# Patient Record
Sex: MALE | Race: WHITE | HISPANIC OR LATINO | Employment: FULL TIME | URBAN - METROPOLITAN AREA
[De-identification: names, ages, dates, MRNs, and addresses within clinical notes are randomized per-mention and may not be internally consistent; named-entity substitution may affect disease eponyms.]

---

## 2018-01-18 NOTE — MISCELLANEOUS
Message  Return to work or school:   Alayna Simental is under my professional care   He was seen in my office on 10/03/2016   He is able to return to work on  10/05/2016            Signatures   Electronically signed by : Marye Runner, CRNP; Oct  3 2016  1:16PM EST                       (Author)    Electronically signed by : Asya Red; Oct  3 2016  3:07PM EST                       (Author)

## 2018-03-11 ENCOUNTER — OFFICE VISIT (OUTPATIENT)
Dept: URGENT CARE | Facility: CLINIC | Age: 48
End: 2018-03-11

## 2018-03-11 VITALS
DIASTOLIC BLOOD PRESSURE: 82 MMHG | BODY MASS INDEX: 28.7 KG/M2 | WEIGHT: 205 LBS | HEART RATE: 64 BPM | TEMPERATURE: 96 F | RESPIRATION RATE: 16 BRPM | HEIGHT: 71 IN | OXYGEN SATURATION: 97 % | SYSTOLIC BLOOD PRESSURE: 126 MMHG

## 2018-03-11 DIAGNOSIS — R21 RASH: Primary | ICD-10-CM

## 2018-03-11 DIAGNOSIS — L25.9 CONTACT DERMATITIS, UNSPECIFIED CONTACT DERMATITIS TYPE, UNSPECIFIED TRIGGER: ICD-10-CM

## 2018-03-11 PROCEDURE — G0382 LEV 3 HOSP TYPE B ED VISIT: HCPCS

## 2018-03-11 RX ORDER — METHYLPREDNISOLONE 4 MG/1
4 TABLET ORAL DAILY
Qty: 21 TABLET | Refills: 0 | Status: SHIPPED | OUTPATIENT
Start: 2018-03-11 | End: 2019-06-17

## 2018-03-11 RX ORDER — DIPHENHYDRAMINE HCL 25 MG
25 TABLET ORAL EVERY 6 HOURS PRN
Qty: 12 TABLET | Refills: 0 | Status: SHIPPED | OUTPATIENT
Start: 2018-03-11 | End: 2019-07-11 | Stop reason: ALTCHOICE

## 2018-03-11 RX ORDER — FAMOTIDINE 20 MG/1
20 TABLET, FILM COATED ORAL 2 TIMES DAILY
Qty: 28 TABLET | Refills: 0 | Status: SHIPPED | OUTPATIENT
Start: 2018-03-11 | End: 2019-06-17

## 2018-03-11 NOTE — PATIENT INSTRUCTIONS
Acute Rash   WHAT YOU NEED TO KNOW:   A rash is irritation, redness, or itchiness in the skin or mucus membranes  Mucus membranes are areas such as the lining of your nose or throat  Acute means the rash starts suddenly, worsens quickly, and lasts a short time  An acute rash may be caused by a disease, such as hepatitis or vasculitis  The rash may be a reaction to something you are allergic to, such as certain foods, or latex  Certain medicines, including antibiotics, NSAIDs, prescription pain medicine, and aspirin can also cause a rash  DISCHARGE INSTRUCTIONS:   Return to the emergency department if:   · You have sudden trouble breathing or chest pain  · You are vomiting, have a headache or muscle aches, and your throat hurts  Contact your healthcare provider if:   · You have a fever  · You get open wounds from scratching your skin, or you have a wound that is red, swollen, or painful  · Your rash lasts longer than 3 months  · You have swelling or pain in your joints  · You have questions or concerns about your condition or care  Medicines:  If your rash does not go away on its own, you may need the following medicines:  · Antihistamines  may be given to help decrease itching  · Steroids  may be given to decrease inflammation  · Antibiotics  help fight or prevent a bacterial infection  · Take your medicine as directed  Contact your healthcare provider if you think your medicine is not helping or if you have side effects  Tell him of her if you are allergic to any medicine  Keep a list of the medicines, vitamins, and herbs you take  Include the amounts, and when and why you take them  Bring the list or the pill bottles to follow-up visits  Carry your medicine list with you in case of an emergency  Prevent a rash or care for your skin when you have a rash:  Dry skin can lead to more problems  Do not scratch your skin if it itches  You may cause a skin infection by scratching   The following may prevent dry skin, and help your skin look better:  · Use thick cream lotions or petroleum jelly to help soothe your rash  These products work well on areas with thick skin, such as your feet  Cool compresses may also be used to soothe your skin  Apply a cool compress or a cool, wet towel, and then cover it with a dry towel  · Use lukewarm water when you bathe  Hot water may damage your skin more  Pat your skin dry  Do not rub your skin with a towel  · Use detergents, soaps, shampoos, and bubble baths made for sensitive skin  Wear clothes that are made of cotton instead of nylon or wool  Cotton is softer, so it will not hurt your skin as much  Follow up with your healthcare provider as directed: You may need to see a dermatologist if healthcare providers do not know what is causing your rash  You may also need to see a dermatologist if your rash does not get better even with treatment  You may need to see a dietitian if you have allergies to foods  Write down your questions so you remember to ask them during your visits  © 2017 [employer] Information is for End User's use only and may not be sold, redistributed or otherwise used for commercial purposes  All illustrations and images included in CareNotes® are the copyrighted property of [employer]  or [employer]ir [employer]unn  The above information is an  only  It is not intended as medical advice for individual conditions or treatments  Talk to your doctor, nurse or pharmacist before following any medical regimen to see if it is safe and effective for you

## 2018-03-11 NOTE — PROGRESS NOTES
Assessment/Plan:    No problem-specific Assessment & Plan notes found for this encounter  Diagnoses and all orders for this visit:    Rash    Contact dermatitis, unspecified contact dermatitis type, unspecified trigger  -     methylprednisolone (MEDROL) 4 mg tablet; Take 1 tablet (4 mg total) by mouth daily Medrol dose pack 60 mg day one 50 mg day 2 40 mg day 3 30 mg day 4 20 mg day 5 10 mg day 6  -     famotidine (PEPCID) 20 mg tablet; Take 1 tablet (20 mg total) by mouth 2 (two) times a day for 14 days  -     diphenhydrAMINE (BENADRYL) 25 mg tablet; Take 1 tablet (25 mg total) by mouth every 6 (six) hours as needed for itching for up to 3 days          Subjective:      Patient ID: Dulce Lorenzo is a 56 y o  male  3 weeks ago had eggroll with sauce he had not had before  Since then red raised rash over bilat thighs, hands, low back + itching denies any new soap detergents lotions perfumes        The following portions of the patient's history were reviewed and updated as appropriate: allergies, current medications, past family history, past medical history, past social history, past surgical history and problem list     Review of Systems   Constitutional: Negative  HENT: Negative  Negative for congestion  Eyes: Negative  Negative for discharge and redness  Respiratory: Negative  Negative for shortness of breath and wheezing  Cardiovascular: Negative  Negative for chest pain  Gastrointestinal: Negative  Negative for abdominal pain  Endocrine: Negative  Genitourinary: Negative  Musculoskeletal: Negative  Negative for gait problem  Skin: Positive for rash  Negative for color change  Neurological: Negative  Negative for weakness  Psychiatric/Behavioral: Negative            Objective:      /82   Pulse 64   Temp (!) 96 °F (35 6 °C)   Resp 16   Ht 5' 11" (1 803 m)   Wt 93 kg (205 lb)   SpO2 97%   BMI 28 59 kg/m²          Physical Exam   Constitutional: He is oriented to person, place, and time  He appears well-developed and well-nourished  HENT:   Head: Normocephalic  Eyes: Conjunctivae and EOM are normal  Pupils are equal, round, and reactive to light  Neck: Normal range of motion  Cardiovascular: Normal rate  Pulmonary/Chest: Effort normal    Abdominal: Soft  Musculoskeletal: Normal range of motion  Neurological: He is alert and oriented to person, place, and time  Skin: Rash noted  Rash is macular and urticarial         Nursing note and vitals reviewed

## 2018-06-27 ENCOUNTER — LAB (OUTPATIENT)
Dept: LAB | Facility: HOSPITAL | Age: 48
End: 2018-06-27
Attending: INTERNAL MEDICINE
Payer: COMMERCIAL

## 2018-06-27 ENCOUNTER — TRANSCRIBE ORDERS (OUTPATIENT)
Dept: LAB | Facility: HOSPITAL | Age: 48
End: 2018-06-27

## 2018-06-27 DIAGNOSIS — I67.89 ACUTE, BUT ILL-DEFINED, CEREBROVASCULAR DISEASE: Primary | ICD-10-CM

## 2018-06-27 DIAGNOSIS — I67.89 ACUTE, BUT ILL-DEFINED, CEREBROVASCULAR DISEASE: ICD-10-CM

## 2018-06-27 LAB
ALBUMIN SERPL BCP-MCNC: 4.2 G/DL (ref 3.5–5)
ALP SERPL-CCNC: 49 U/L (ref 46–116)
ALT SERPL W P-5'-P-CCNC: 47 U/L (ref 12–78)
ANION GAP SERPL CALCULATED.3IONS-SCNC: 8 MMOL/L (ref 4–13)
AST SERPL W P-5'-P-CCNC: 22 U/L (ref 5–45)
BASOPHILS # BLD AUTO: 0.07 THOUSANDS/ΜL (ref 0–0.1)
BASOPHILS NFR BLD AUTO: 1 % (ref 0–1)
BILIRUB SERPL-MCNC: 0.73 MG/DL (ref 0.2–1)
BUN SERPL-MCNC: 12 MG/DL (ref 5–25)
CALCIUM SERPL-MCNC: 9.2 MG/DL (ref 8.3–10.1)
CHLORIDE SERPL-SCNC: 104 MMOL/L (ref 100–108)
CHOLEST SERPL-MCNC: 246 MG/DL (ref 50–200)
CO2 SERPL-SCNC: 26 MMOL/L (ref 21–32)
CREAT SERPL-MCNC: 0.78 MG/DL (ref 0.6–1.3)
CRP SERPL QL: <3 MG/L
EOSINOPHIL # BLD AUTO: 1.25 THOUSAND/ΜL (ref 0–0.61)
EOSINOPHIL NFR BLD AUTO: 14 % (ref 0–6)
ERYTHROCYTE [DISTWIDTH] IN BLOOD BY AUTOMATED COUNT: 12.6 % (ref 11.6–15.1)
ERYTHROCYTE [SEDIMENTATION RATE] IN BLOOD: 11 MM/HOUR (ref 0–10)
GFR SERPL CREATININE-BSD FRML MDRD: 108 ML/MIN/1.73SQ M
GLUCOSE SERPL-MCNC: 95 MG/DL (ref 65–140)
HCT VFR BLD AUTO: 47.4 % (ref 36.5–49.3)
HDLC SERPL-MCNC: 44 MG/DL (ref 40–60)
HGB BLD-MCNC: 15.7 G/DL (ref 12–17)
IMM GRANULOCYTES # BLD AUTO: 0.04 THOUSAND/UL (ref 0–0.2)
IMM GRANULOCYTES NFR BLD AUTO: 1 % (ref 0–2)
LDLC SERPL CALC-MCNC: 156 MG/DL (ref 0–100)
LYMPHOCYTES # BLD AUTO: 1.72 THOUSANDS/ΜL (ref 0.6–4.47)
LYMPHOCYTES NFR BLD AUTO: 20 % (ref 14–44)
MCH RBC QN AUTO: 31 PG (ref 26.8–34.3)
MCHC RBC AUTO-ENTMCNC: 33.1 G/DL (ref 31.4–37.4)
MCV RBC AUTO: 94 FL (ref 82–98)
MONOCYTES # BLD AUTO: 0.63 THOUSAND/ΜL (ref 0.17–1.22)
MONOCYTES NFR BLD AUTO: 7 % (ref 4–12)
NEUTROPHILS # BLD AUTO: 5.1 THOUSANDS/ΜL (ref 1.85–7.62)
NEUTS SEG NFR BLD AUTO: 57 % (ref 43–75)
NONHDLC SERPL-MCNC: 202 MG/DL
NRBC BLD AUTO-RTO: 0 /100 WBCS
PLATELET # BLD AUTO: 249 THOUSANDS/UL (ref 149–390)
PMV BLD AUTO: 10.5 FL (ref 8.9–12.7)
POTASSIUM SERPL-SCNC: 3.9 MMOL/L (ref 3.5–5.3)
PROT SERPL-MCNC: 7.7 G/DL (ref 6.4–8.2)
RBC # BLD AUTO: 5.06 MILLION/UL (ref 3.88–5.62)
SODIUM SERPL-SCNC: 138 MMOL/L (ref 136–145)
TRIGL SERPL-MCNC: 228 MG/DL
WBC # BLD AUTO: 8.81 THOUSAND/UL (ref 4.31–10.16)

## 2018-06-27 PROCEDURE — 36415 COLL VENOUS BLD VENIPUNCTURE: CPT

## 2018-06-27 PROCEDURE — 86140 C-REACTIVE PROTEIN: CPT

## 2018-06-27 PROCEDURE — 85025 COMPLETE CBC W/AUTO DIFF WBC: CPT

## 2018-06-27 PROCEDURE — 80061 LIPID PANEL: CPT

## 2018-06-27 PROCEDURE — 85652 RBC SED RATE AUTOMATED: CPT

## 2018-06-27 PROCEDURE — 80053 COMPREHEN METABOLIC PANEL: CPT

## 2018-11-02 ENCOUNTER — DOCUMENTATION (OUTPATIENT)
Dept: NEUROLOGY | Facility: CLINIC | Age: 48
End: 2018-11-02

## 2018-11-02 ENCOUNTER — TELEPHONE (OUTPATIENT)
Dept: NEUROLOGY | Facility: CLINIC | Age: 48
End: 2018-11-02

## 2018-11-02 ENCOUNTER — OFFICE VISIT (OUTPATIENT)
Dept: NEUROLOGY | Facility: CLINIC | Age: 48
End: 2018-11-02
Payer: COMMERCIAL

## 2018-11-02 VITALS
WEIGHT: 205 LBS | BODY MASS INDEX: 28.7 KG/M2 | DIASTOLIC BLOOD PRESSURE: 80 MMHG | HEIGHT: 71 IN | SYSTOLIC BLOOD PRESSURE: 122 MMHG | HEART RATE: 64 BPM | RESPIRATION RATE: 14 BRPM

## 2018-11-02 DIAGNOSIS — R29.898 RIGHT LEG WEAKNESS: ICD-10-CM

## 2018-11-02 DIAGNOSIS — R41.0 DISORIENTATION: ICD-10-CM

## 2018-11-02 DIAGNOSIS — I63.9 CEREBROVASCULAR ACCIDENT (CVA), UNSPECIFIED MECHANISM (HCC): ICD-10-CM

## 2018-11-02 DIAGNOSIS — R29.2 HYPERREFLEXIA OF LOWER EXTREMITY: ICD-10-CM

## 2018-11-02 DIAGNOSIS — R26.89 IMBALANCE: ICD-10-CM

## 2018-11-02 DIAGNOSIS — R00.2 HEART PALPITATIONS: ICD-10-CM

## 2018-11-02 DIAGNOSIS — R53.1 WEAKNESS: Primary | ICD-10-CM

## 2018-11-02 DIAGNOSIS — Z86.39 H/O HYPERGLYCEMIA: ICD-10-CM

## 2018-11-02 DIAGNOSIS — R47.1 DYSARTHRIA: ICD-10-CM

## 2018-11-02 DIAGNOSIS — S06.9X5S TRAUMATIC BRAIN INJURY, WITH LOSS OF CONSCIOUSNESS GREATER THAN 24 HOURS WITH RETURN TO PRE-EXISTING CONSCIOUS LEVEL, SEQUELA (HCC): ICD-10-CM

## 2018-11-02 PROCEDURE — 99245 OFF/OP CONSLTJ NEW/EST HI 55: CPT | Performed by: PSYCHIATRY & NEUROLOGY

## 2018-11-02 RX ORDER — ATORVASTATIN CALCIUM 40 MG/1
40 TABLET, FILM COATED ORAL DAILY
COMMUNITY
End: 2020-01-29

## 2018-11-02 RX ORDER — ADHESIVE BANDAGE 3/4"
BANDAGE TOPICAL
Qty: 1 EACH | Refills: 0 | Status: SHIPPED | OUTPATIENT
Start: 2018-11-02 | End: 2019-06-17
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
## 2018-11-02 NOTE — TELEPHONE ENCOUNTER
----- Message from [address], DO sent at 11/2/2018 11:22 AM EDT -----  Regarding: Start ASA 81  Please call patient (significant other speaks [place]- her name is Aly Navas- number should be in chart) and notify her that he is okay to start aspirin  We received a copy of his CT records  CT scan showed a small stroke on the right side of his brain, and his left side weakness, facial droop, sensory loss noted on exam today can correlated to stroke in that location       Thanks

## 2019-05-06 ENCOUNTER — TRANSCRIBE ORDERS (OUTPATIENT)
Dept: LAB | Facility: CLINIC | Age: 49
End: 2019-05-06

## 2019-05-06 ENCOUNTER — APPOINTMENT (OUTPATIENT)
Dept: LAB | Facility: CLINIC | Age: 49
End: 2019-05-06
Payer: COMMERCIAL

## 2019-05-06 DIAGNOSIS — R26.89 IMBALANCE: ICD-10-CM

## 2019-05-06 DIAGNOSIS — R00.2 HEART PALPITATIONS: ICD-10-CM

## 2019-05-06 DIAGNOSIS — Z86.39 H/O HYPERGLYCEMIA: ICD-10-CM

## 2019-05-06 DIAGNOSIS — E78.5 HYPERLIPIDEMIA, UNSPECIFIED HYPERLIPIDEMIA TYPE: Primary | ICD-10-CM

## 2019-05-06 DIAGNOSIS — E78.5 HYPERLIPIDEMIA, UNSPECIFIED HYPERLIPIDEMIA TYPE: ICD-10-CM

## 2019-05-06 DIAGNOSIS — R53.1 WEAKNESS: ICD-10-CM

## 2019-05-06 LAB
CHOLEST SERPL-MCNC: 190 MG/DL (ref 50–200)
EST. AVERAGE GLUCOSE BLD GHB EST-MCNC: 123 MG/DL
FOLATE SERPL-MCNC: >20 NG/ML (ref 3.1–17.5)
HBA1C MFR BLD: 5.9 % (ref 4.2–6.3)
HDLC SERPL-MCNC: 51 MG/DL (ref 40–60)
LDLC SERPL CALC-MCNC: 91 MG/DL (ref 0–100)
NONHDLC SERPL-MCNC: 139 MG/DL
TRIGL SERPL-MCNC: 240 MG/DL
TSH SERPL DL<=0.05 MIU/L-ACNC: 2.11 UIU/ML (ref 0.36–3.74)
VIT B12 SERPL-MCNC: 385 PG/ML (ref 100–900)

## 2019-05-06 PROCEDURE — 84443 ASSAY THYROID STIM HORMONE: CPT

## 2019-05-06 PROCEDURE — 80061 LIPID PANEL: CPT

## 2019-05-06 PROCEDURE — 82607 VITAMIN B-12: CPT

## 2019-05-06 PROCEDURE — 36415 COLL VENOUS BLD VENIPUNCTURE: CPT

## 2019-05-06 PROCEDURE — 83036 HEMOGLOBIN GLYCOSYLATED A1C: CPT

## 2019-05-06 PROCEDURE — 82746 ASSAY OF FOLIC ACID SERUM: CPT

## 2019-05-07 ENCOUNTER — HOSPITAL ENCOUNTER (OUTPATIENT)
Dept: NEUROLOGY | Facility: AMBULATORY SURGERY CENTER | Age: 49
Discharge: HOME | End: 2019-05-07
Payer: COMMERCIAL

## 2019-05-07 ENCOUNTER — HOSPITAL ENCOUNTER (OUTPATIENT)
Dept: RADIOLOGY | Facility: HOSPITAL | Age: 49
Discharge: HOME | End: 2019-05-07
Attending: PSYCHIATRY & NEUROLOGY
Payer: COMMERCIAL

## 2019-05-07 DIAGNOSIS — S06.9X5S TRAUMATIC BRAIN INJURY, WITH LOSS OF CONSCIOUSNESS GREATER THAN 24 HOURS WITH RETURN TO PRE-EXISTING CONSCIOUS LEVEL, SEQUELA (HCC): ICD-10-CM

## 2019-05-07 DIAGNOSIS — I63.9 CEREBROVASCULAR ACCIDENT (CVA), UNSPECIFIED MECHANISM (HCC): ICD-10-CM

## 2019-05-07 DIAGNOSIS — R29.898 RIGHT LEG WEAKNESS: ICD-10-CM

## 2019-05-07 DIAGNOSIS — R29.2 HYPERREFLEXIA OF LOWER EXTREMITY: ICD-10-CM

## 2019-05-07 DIAGNOSIS — R41.0 DISORIENTATION: ICD-10-CM

## 2019-05-07 PROCEDURE — 95816 EEG AWAKE AND DROWSY: CPT | Performed by: PSYCHIATRY & NEUROLOGY

## 2019-05-07 PROCEDURE — 72131 CT LUMBAR SPINE W/O DYE: CPT

## 2019-05-07 PROCEDURE — 95816 EEG AWAKE AND DROWSY: CPT

## 2019-05-15 ENCOUNTER — EVALUATION (OUTPATIENT)
Dept: PHYSICAL THERAPY | Facility: CLINIC | Age: 49
End: 2019-05-15
Payer: COMMERCIAL

## 2019-05-15 DIAGNOSIS — M54.16 LUMBAR RADICULOPATHY: ICD-10-CM

## 2019-05-15 DIAGNOSIS — Z86.73 HISTORY OF CVA (CEREBROVASCULAR ACCIDENT): ICD-10-CM

## 2019-05-15 DIAGNOSIS — R26.89 IMBALANCE: Primary | ICD-10-CM

## 2019-05-15 PROCEDURE — 97163 PT EVAL HIGH COMPLEX 45 MIN: CPT | Performed by: PHYSICAL THERAPIST

## 2019-05-16 ENCOUNTER — TELEPHONE (OUTPATIENT)
Dept: NEUROLOGY | Facility: CLINIC | Age: 49
End: 2019-05-16

## 2019-05-16 DIAGNOSIS — M54.16 RIGHT LUMBAR RADICULITIS: ICD-10-CM

## 2019-05-16 DIAGNOSIS — R29.898 RIGHT LEG WEAKNESS: Primary | ICD-10-CM

## 2019-05-17 DIAGNOSIS — R29.898 RIGHT LEG WEAKNESS: Primary | ICD-10-CM

## 2019-05-17 DIAGNOSIS — M54.16 RIGHT LUMBAR RADICULITIS: ICD-10-CM

## 2019-05-20 ENCOUNTER — APPOINTMENT (OUTPATIENT)
Dept: PHYSICAL THERAPY | Facility: CLINIC | Age: 49
End: 2019-05-20
Payer: COMMERCIAL

## 2019-05-21 ENCOUNTER — HOSPITAL ENCOUNTER (OUTPATIENT)
Dept: NEUROLOGY | Facility: HOSPITAL | Age: 49
Discharge: HOME | End: 2019-05-21
Attending: PSYCHIATRY & NEUROLOGY
Payer: COMMERCIAL

## 2019-05-21 DIAGNOSIS — R29.898 RIGHT LEG WEAKNESS: ICD-10-CM

## 2019-05-21 DIAGNOSIS — M54.16 RIGHT LUMBAR RADICULITIS: ICD-10-CM

## 2019-05-21 PROCEDURE — 95886 MUSC TEST DONE W/N TEST COMP: CPT | Performed by: PSYCHIATRY & NEUROLOGY

## 2019-05-21 PROCEDURE — 95909 NRV CNDJ TST 5-6 STUDIES: CPT | Performed by: PSYCHIATRY & NEUROLOGY

## 2019-05-22 ENCOUNTER — APPOINTMENT (OUTPATIENT)
Dept: PHYSICAL THERAPY | Facility: CLINIC | Age: 49
End: 2019-05-22
Payer: COMMERCIAL

## 2019-05-28 ENCOUNTER — HOSPITAL ENCOUNTER (OUTPATIENT)
Dept: NON INVASIVE DIAGNOSTICS | Facility: CLINIC | Age: 49
Discharge: HOME | End: 2019-05-28
Payer: COMMERCIAL

## 2019-05-28 DIAGNOSIS — R00.2 HEART PALPITATIONS: ICD-10-CM

## 2019-05-28 DIAGNOSIS — R53.1 WEAKNESS: ICD-10-CM

## 2019-05-28 DIAGNOSIS — I63.9 CEREBROVASCULAR ACCIDENT (CVA), UNSPECIFIED MECHANISM (HCC): ICD-10-CM

## 2019-05-28 DIAGNOSIS — R47.1 DYSARTHRIA: ICD-10-CM

## 2019-05-28 PROCEDURE — 93880 EXTRACRANIAL BILAT STUDY: CPT

## 2019-05-28 PROCEDURE — 93306 TTE W/DOPPLER COMPLETE: CPT

## 2019-05-28 PROCEDURE — 93306 TTE W/DOPPLER COMPLETE: CPT | Performed by: INTERNAL MEDICINE

## 2019-05-28 PROCEDURE — 93880 EXTRACRANIAL BILAT STUDY: CPT | Performed by: SURGERY

## 2019-05-29 ENCOUNTER — APPOINTMENT (OUTPATIENT)
Dept: PHYSICAL THERAPY | Facility: CLINIC | Age: 49
End: 2019-05-29
Payer: COMMERCIAL

## 2019-05-30 ENCOUNTER — TELEPHONE (OUTPATIENT)
Dept: NEUROLOGY | Facility: CLINIC | Age: 49
End: 2019-05-30

## 2019-06-17 ENCOUNTER — TRANSCRIBE ORDERS (OUTPATIENT)
Dept: PAIN MEDICINE | Facility: CLINIC | Age: 49
End: 2019-06-17

## 2019-06-17 ENCOUNTER — CONSULT (OUTPATIENT)
Dept: PAIN MEDICINE | Facility: CLINIC | Age: 49
End: 2019-06-17
Payer: COMMERCIAL

## 2019-06-17 VITALS — DIASTOLIC BLOOD PRESSURE: 70 MMHG | SYSTOLIC BLOOD PRESSURE: 126 MMHG | HEART RATE: 70 BPM

## 2019-06-17 DIAGNOSIS — M51.17 INTERVERTEBRAL DISC DISORDER WITH RADICULOPATHY OF LUMBOSACRAL REGION: Primary | ICD-10-CM

## 2019-06-17 DIAGNOSIS — R29.898 LEFT LEG WEAKNESS: ICD-10-CM

## 2019-06-17 PROCEDURE — 99244 OFF/OP CNSLTJ NEW/EST MOD 40: CPT | Performed by: ANESTHESIOLOGY

## 2019-07-11 ENCOUNTER — HOSPITAL ENCOUNTER (OUTPATIENT)
Dept: RADIOLOGY | Facility: CLINIC | Age: 49
Discharge: HOME | End: 2019-07-11
Attending: ANESTHESIOLOGY
Payer: COMMERCIAL

## 2019-07-11 VITALS
TEMPERATURE: 97.9 F | DIASTOLIC BLOOD PRESSURE: 72 MMHG | OXYGEN SATURATION: 99 % | HEART RATE: 50 BPM | RESPIRATION RATE: 50 BRPM | SYSTOLIC BLOOD PRESSURE: 106 MMHG

## 2019-07-11 DIAGNOSIS — M51.17 INTERVERTEBRAL DISC DISORDER WITH RADICULOPATHY OF LUMBOSACRAL REGION: ICD-10-CM

## 2019-07-11 PROCEDURE — 64483 NJX AA&/STRD TFRM EPI L/S 1: CPT | Performed by: ANESTHESIOLOGY

## 2019-07-11 RX ORDER — BUPIVACAINE HCL/PF 2.5 MG/ML
10 VIAL (ML) INJECTION ONCE
Status: COMPLETED | OUTPATIENT
Start: 2019-07-11 | End: 2019-07-11

## 2019-07-11 RX ORDER — 0.9 % SODIUM CHLORIDE 0.9 %
10 VIAL (ML) INJECTION ONCE
Status: COMPLETED | OUTPATIENT
Start: 2019-07-11 | End: 2019-07-11

## 2019-07-11 RX ORDER — METHYLPREDNISOLONE ACETATE 80 MG/ML
80 INJECTION, SUSPENSION INTRA-ARTICULAR; INTRALESIONAL; INTRAMUSCULAR; PARENTERAL; SOFT TISSUE ONCE
Status: COMPLETED | OUTPATIENT
Start: 2019-07-11 | End: 2019-07-11

## 2019-07-11 RX ADMIN — SODIUM CHLORIDE 5 ML: 9 INJECTION, SOLUTION INTRAMUSCULAR; INTRAVENOUS; SUBCUTANEOUS at 09:36

## 2019-07-11 RX ADMIN — METHYLPREDNISOLONE ACETATE 80 MG: 80 INJECTION, SUSPENSION INTRA-ARTICULAR; INTRALESIONAL; INTRAMUSCULAR; PARENTERAL; SOFT TISSUE at 09:37

## 2019-07-11 RX ADMIN — BUPIVACAINE HYDROCHLORIDE 2 ML: 2.5 INJECTION, SOLUTION EPIDURAL; INFILTRATION; INTRACAUDAL at 09:37

## 2019-07-11 RX ADMIN — IOHEXOL 1 ML: 300 INJECTION, SOLUTION INTRAVENOUS at 09:37

## 2019-07-11 RX ADMIN — Medication 5 ML: at 09:36

## 2019-07-11 NOTE — DISCHARGE INSTR - LAB
Epidural Steroid Injection   WHAT YOU NEED TO KNOW:   An epidural steroid injection (NICOLLE) is a procedure to inject steroid medicine into the epidural space  The epidural space is between your spinal cord and vertebrae  Steroids reduce inflammation and fluid buildup in your spine that may be causing pain  You may be given pain medicine along with the steroids  ACTIVITY  · Do not drive or operate machinery today  · No strenuous activity today  bending, lifting, etc   · You may resume normal activites starting tomorrow  start slowly and as tolerated  · You may shower today, but no tub baths or hot tubs  · You may have numbness for several hours from the local anesthetic  Please use caution and common sense, especially with weight-bearing activities  CARE OF THE INJECTION SITE  · If you have soreness or pain, apply ice to the area today (20 minutes on/20 minutes off)  · Starting tomorrow, you may use warm, moist heat or ice if needed  · You may have an increase or change in your discomfort for 36-48 hours after your treatment  · Apply ice and continue with any pain medication you have been prescribed  · Notify [facility] if you have any of the following: redness, drainage, swelling, headache, stiff neck or fever above 100°F     SPECIAL INSTRUCTIONS  · Our office will contact you in approximately 7 days for a progress report  MEDICATIONS  · Continue to take all routine medications  · Our office may have instructed you to hold some medications  If you have a problem specifically related to your procedure, please call our office at [phone]  Problems not related to your procedure should be directed to your primary care physician

## 2019-07-11 NOTE — H&P
History of Present Illness: The patient is a 53 y o  male who presents with complaints of left lower back and leg pain secondary to lumbar disc disease and is here today for bilateral L5 transforaminal epidural steroid injection  Patient Active Problem List   Diagnosis    Rash    Contact dermatitis    [facility]A) [facility]    [facility]    Polyneuropathy, peripheral sensorimotor axonal       Past Medical History:   Diagnosis Date    Hyperlipidemia     Migraine        Past Surgical History:   Procedure Laterality Date    FACIAL FRACTURE SURGERY           Current Outpatient Medications:     atorvastatin (LIPITOR) 40 mg tablet, Take 40 mg by mouth daily, Disp: , Rfl:     Multiple Vitamin (MULTIVITAMINS PO), Take by mouth, Disp: , Rfl:     Current Facility-Administered Medications:     bupivacaine (PF) (MARCAINE) 0 25 % injection 10 mL, 10 mL, Epidural, Once, Clem Romero MD    iohexol (OMNIPAQUE) 300 mg/mL injection 50 mL, 50 mL, Epidural, Once, Clem Romero MD    lidocaine (PF) (XYLOCAINE-MPF) 2 % injection 5 mL, 5 mL, Infiltration, Once, Clem Romero MD    methylPREDNISolone acetate (DEPO-MEDROL) injection 80 mg, 80 mg, Epidural, Once, Clem Romero MD    sodium chloride (PF) 0 9 % injection 10 mL, 10 mL, Infiltration, Once, Clem Romero MD    No Known Allergies    Physical Exam:   Vitals:    07/11/19 0920   BP: 118/73   Pulse: (!) 50   Resp: 20   Temp: 97 9 °F (36 6 °C)   SpO2: 98%     General: Awake, Alert, Oriented x 3, Mood and affect appropriate  Respiratory: Respirations even and unlabored  Cardiovascular: Peripheral pulses intact; no edema  Musculoskeletal Exam:   Left lower back tenderness    ASA Score: 3    Patient/Chart Verification  Patient ID Verified: Verbal  Consents Confirmed: To be obtained in the Pre-Procedure area  H&P( within 30 days) Verified: To be obtained in the Pre-Procedure area  Allergies Reviewed:  Yes  Anticoag/NSAID held?: NA  Currently on antibiotics?: No    Assessment:   1   Intervertebral disc disorder with radiculopathy of lumbosacral region        Plan:   Bilateral L5 transforaminal epidural steroid injection

## 2019-07-17 ENCOUNTER — OFFICE VISIT (OUTPATIENT)
Dept: NEUROLOGY | Facility: CLINIC | Age: 49
End: 2019-07-17
Payer: COMMERCIAL

## 2019-07-17 ENCOUNTER — TELEPHONE (OUTPATIENT)
Dept: NEUROLOGY | Facility: CLINIC | Age: 49
End: 2019-07-17

## 2019-07-17 VITALS
HEART RATE: 75 BPM | HEIGHT: 71 IN | BODY MASS INDEX: 26.88 KG/M2 | SYSTOLIC BLOOD PRESSURE: 120 MMHG | WEIGHT: 192 LBS | RESPIRATION RATE: 16 BRPM | DIASTOLIC BLOOD PRESSURE: 80 MMHG

## 2019-07-17 DIAGNOSIS — S06.9X5S TRAUMATIC BRAIN INJURY, WITH LOSS OF CONSCIOUSNESS GREATER THAN 24 HOURS WITH RETURN TO PRE-EXISTING CONSCIOUS LEVEL, SEQUELA (HCC): ICD-10-CM

## 2019-07-17 DIAGNOSIS — G60.8 POLYNEUROPATHY, PERIPHERAL SENSORIMOTOR AXONAL: ICD-10-CM

## 2019-07-17 DIAGNOSIS — M51.17 INTERVERTEBRAL DISC DISORDER WITH RADICULOPATHY OF LUMBOSACRAL REGION: ICD-10-CM

## 2019-07-17 DIAGNOSIS — I63.30 CEREBROVASCULAR ACCIDENT (CVA) DUE TO THROMBOSIS OF CEREBRAL ARTERY (HCC): Primary | ICD-10-CM

## 2019-07-17 PROCEDURE — 99215 OFFICE O/P EST HI 40 MIN: CPT | Performed by: PSYCHIATRY & NEUROLOGY

## 2019-07-17 RX ORDER — GABAPENTIN 300 MG/1
300 CAPSULE ORAL
Qty: 30 CAPSULE | Refills: 3 | Status: SHIPPED | OUTPATIENT
Start: 2019-07-17 | End: 2019-08-29 | Stop reason: CLARIF
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
## 2019-07-17 NOTE — TELEPHONE ENCOUNTER
Called patient to see if patient still coming to apt today in [place] with Dr Jim Conway   [address] [place] [address] Drive

## 2019-07-17 NOTE — PATIENT INSTRUCTIONS
Reduce sugar intake  Consider trial of over the counter alphalipoic acid 300-600 mg daily for neuropathy

## 2019-07-18 ENCOUNTER — TELEPHONE (OUTPATIENT)
Dept: PAIN MEDICINE | Facility: CLINIC | Age: 49
End: 2019-07-18

## 2019-08-08 ENCOUNTER — TELEPHONE (OUTPATIENT)
Dept: PAIN MEDICINE | Facility: MEDICAL CENTER | Age: 49
End: 2019-08-08

## 2019-08-08 DIAGNOSIS — M51.16 INTERVERTEBRAL DISC DISORDER WITH RADICULOPATHY OF LUMBAR REGION: ICD-10-CM

## 2019-08-08 DIAGNOSIS — M79.18 MYOFASCIAL PAIN SYNDROME: Primary | ICD-10-CM

## 2019-08-08 DIAGNOSIS — M54.16 LUMBAR RADICULOPATHY: ICD-10-CM

## 2019-08-08 RX ORDER — TIZANIDINE 4 MG/1
4 TABLET ORAL
Qty: 30 TABLET | Refills: 0 | Status: SHIPPED | OUTPATIENT
Start: 2019-08-08 | End: 2020-01-29 | Stop reason: SDUPTHER

## 2019-08-08 NOTE — TELEPHONE ENCOUNTER
S/W pt's wife, he is s/p B/L L5 TFESI from 7/11/19, she said 2 days ago he began with bad pain again  Current pain is only in the center of the lower back, no leg pain  He has neuropathy of his feet  She said the pain is interrupting his sleep and she wakes up and finds him in the living room  She has been suing ibuprofen for the pain  I saw gabapentin on his med list from the neurologist written 7/17/19 and asked if he was taking that and she said when they saw the neurologist on 7/17 she thought the doctor wasn't going to prescribe it  Wife wasn't aware it was sent to the pharmacy  She is going to call their office to clarify if he is suppose to pick it up and start taking it  I told her that FQ is out of office for 2 wks but will have our CRNP NM advise some rec  In the meantime he should continue with the ibuprofen and try using ice or heat as needed  Wife aware we will c/b later today or tomorrow with NM's rec  Wife Doris Huffman cell [phone] or work # [phone]

## 2019-08-08 NOTE — TELEPHONE ENCOUNTER
Yes we can repeat injection  In the meantime I can prescribe a muscle relaxant to see if that helps and rule out if pain muscular  I will send  Script for Tizanidine 4 mg at night if interested   Let me know     Order for bilateral L5 TFESI in [facility]

## 2019-08-08 NOTE — TELEPHONE ENCOUNTER
Pt wife is calling stating pt is in increasing pain after his injection that was on 7/11    Pt wife states he cannot sleep, pain level 8/10 she is asking if the patient can have another injection and in the meantime if there is something Dr Melissa Lopez can prescribe to help with the pain?       Pt wife can be reached at [phone] or [phone]

## 2019-08-08 NOTE — TELEPHONE ENCOUNTER
Told wife repeat inj has been ordered and a  will call them over the next several days to schedule it  Wife would like to proceed with tizanidine, told common s/e are drowsiness and dizziness  Pls send Rx to [facility] in [place] on file  No need to c/b once sent as she can't pick it up until tomorrow

## 2019-08-29 ENCOUNTER — HOSPITAL ENCOUNTER (INPATIENT)
Facility: HOSPITAL | Age: 49
LOS: 1 days | Discharge: HOME | DRG: 149 | End: 2019-08-31
Attending: EMERGENCY MEDICINE | Admitting: INTERNAL MEDICINE
Payer: COMMERCIAL

## 2019-08-29 ENCOUNTER — APPOINTMENT (EMERGENCY)
Dept: RADIOLOGY | Facility: HOSPITAL | Age: 49
DRG: 149 | End: 2019-08-29
Payer: COMMERCIAL

## 2019-08-29 DIAGNOSIS — R09.89 SUSPECTED CEREBROVASCULAR ACCIDENT (CVA): ICD-10-CM

## 2019-08-29 DIAGNOSIS — R42 DIZZINESS: ICD-10-CM

## 2019-08-29 DIAGNOSIS — R42 VERTIGO: Primary | ICD-10-CM

## 2019-08-29 DIAGNOSIS — H53.2 DOUBLE VISION: ICD-10-CM

## 2019-08-29 LAB
ALBUMIN SERPL BCP-MCNC: 4.6 G/DL (ref 3.5–5)
ALP SERPL-CCNC: 61 U/L (ref 46–116)
ALT SERPL W P-5'-P-CCNC: 43 U/L (ref 12–78)
ANION GAP SERPL CALCULATED.3IONS-SCNC: 9 MMOL/L (ref 4–13)
AST SERPL W P-5'-P-CCNC: 20 U/L (ref 5–45)
ATRIAL RATE: 63 BPM
BASOPHILS # BLD AUTO: 0.04 THOUSANDS/ΜL (ref 0–0.1)
BASOPHILS NFR BLD AUTO: 0 % (ref 0–1)
BILIRUB SERPL-MCNC: 0.39 MG/DL (ref 0.2–1)
BUN SERPL-MCNC: 10 MG/DL (ref 5–25)
CALCIUM SERPL-MCNC: 9.4 MG/DL (ref 8.3–10.1)
CHLORIDE SERPL-SCNC: 110 MMOL/L (ref 100–108)
CO2 SERPL-SCNC: 26 MMOL/L (ref 21–32)
CREAT SERPL-MCNC: 0.89 MG/DL (ref 0.6–1.3)
EOSINOPHIL # BLD AUTO: 0.08 THOUSAND/ΜL (ref 0–0.61)
EOSINOPHIL NFR BLD AUTO: 1 % (ref 0–6)
ERYTHROCYTE [DISTWIDTH] IN BLOOD BY AUTOMATED COUNT: 12.3 % (ref 11.6–15.1)
GFR SERPL CREATININE-BSD FRML MDRD: 102 ML/MIN/1.73SQ M
GLUCOSE SERPL-MCNC: 104 MG/DL (ref 65–140)
HCT VFR BLD AUTO: 43.9 % (ref 36.5–49.3)
HGB BLD-MCNC: 14.7 G/DL (ref 12–17)
IMM GRANULOCYTES # BLD AUTO: 0.05 THOUSAND/UL (ref 0–0.2)
IMM GRANULOCYTES NFR BLD AUTO: 1 % (ref 0–2)
LYMPHOCYTES # BLD AUTO: 2.12 THOUSANDS/ΜL (ref 0.6–4.47)
LYMPHOCYTES NFR BLD AUTO: 21 % (ref 14–44)
MCH RBC QN AUTO: 30.7 PG (ref 26.8–34.3)
MCHC RBC AUTO-ENTMCNC: 33.5 G/DL (ref 31.4–37.4)
MCV RBC AUTO: 92 FL (ref 82–98)
MONOCYTES # BLD AUTO: 0.94 THOUSAND/ΜL (ref 0.17–1.22)
MONOCYTES NFR BLD AUTO: 9 % (ref 4–12)
NEUTROPHILS # BLD AUTO: 7.02 THOUSANDS/ΜL (ref 1.85–7.62)
NEUTS SEG NFR BLD AUTO: 68 % (ref 43–75)
NRBC BLD AUTO-RTO: 0 /100 WBCS
P AXIS: 54 DEGREES
PLATELET # BLD AUTO: 249 THOUSANDS/UL (ref 149–390)
PMV BLD AUTO: 10.4 FL (ref 8.9–12.7)
POTASSIUM SERPL-SCNC: 3.9 MMOL/L (ref 3.5–5.3)
PR INTERVAL: 148 MS
PROT SERPL-MCNC: 7.8 G/DL (ref 6.4–8.2)
QRS AXIS: -6 DEGREES
QRSD INTERVAL: 94 MS
QT INTERVAL: 394 MS
QTC INTERVAL: 403 MS
RBC # BLD AUTO: 4.79 MILLION/UL (ref 3.88–5.62)
SODIUM SERPL-SCNC: 145 MMOL/L (ref 136–145)
T WAVE AXIS: 7 DEGREES
VENTRICULAR RATE: 63 BPM
WBC # BLD AUTO: 10.25 THOUSAND/UL (ref 4.31–10.16)

## 2019-08-29 PROCEDURE — 70496 CT ANGIOGRAPHY HEAD: CPT

## 2019-08-29 PROCEDURE — 80053 COMPREHEN METABOLIC PANEL: CPT | Performed by: EMERGENCY MEDICINE

## 2019-08-29 PROCEDURE — 70498 CT ANGIOGRAPHY NECK: CPT

## 2019-08-29 PROCEDURE — 85025 COMPLETE CBC W/AUTO DIFF WBC: CPT | Performed by: EMERGENCY MEDICINE

## 2019-08-29 PROCEDURE — 99285 EMERGENCY DEPT VISIT HI MDM: CPT | Performed by: EMERGENCY MEDICINE

## 2019-08-29 PROCEDURE — 93005 ELECTROCARDIOGRAM TRACING: CPT

## 2019-08-29 PROCEDURE — 99285 EMERGENCY DEPT VISIT HI MDM: CPT

## 2019-08-29 PROCEDURE — 93010 ELECTROCARDIOGRAM REPORT: CPT | Performed by: INTERNAL MEDICINE

## 2019-08-29 PROCEDURE — 36415 COLL VENOUS BLD VENIPUNCTURE: CPT | Performed by: EMERGENCY MEDICINE

## 2019-08-29 RX ORDER — TIZANIDINE 4 MG/1
4 TABLET ORAL DAILY PRN
Status: DISCONTINUED | OUTPATIENT
Start: 2019-08-29 | End: 2019-08-31 | Stop reason: HOSPADM

## 2019-08-29 RX ORDER — MECLIZINE HYDROCHLORIDE 25 MG/1
25 TABLET ORAL EVERY 8 HOURS PRN
Status: DISCONTINUED | OUTPATIENT
Start: 2019-08-29 | End: 2019-08-31 | Stop reason: HOSPADM

## 2019-08-29 RX ORDER — ATORVASTATIN CALCIUM 40 MG/1
40 TABLET, FILM COATED ORAL
Status: DISCONTINUED | OUTPATIENT
Start: 2019-08-30 | End: 2019-08-30

## 2019-08-29 RX ORDER — ASPIRIN 81 MG/1
81 TABLET, CHEWABLE ORAL DAILY
Status: DISCONTINUED | OUTPATIENT
Start: 2019-08-30 | End: 2019-08-31 | Stop reason: HOSPADM

## 2019-08-29 RX ADMIN — IOHEXOL 85 ML: 350 INJECTION, SOLUTION INTRAVENOUS at 20:51

## 2019-08-29 NOTE — LETTER
[address] [facility] [address]  [address] [facility] [address]  Dept: [phone]    August 31, 2019     Patient: Jeremiah Gill   YOB: 1970   Date of Visit: 8/29/2019       To Whom it May Concern:    Jeremiah Gill is under my professional care  He was seen in the hospital from 8/29/2019   to 08/31/19  He may return to work on 09/02/2019    If you have any questions or concerns, please don't hesitate to call           Sincerely,          Tod Garcia MD

## 2019-08-29 NOTE — ED PROVIDER NOTES
History  Chief Complaint   Patient presents with    Dizziness     dizziness for 3 days,no chest pain, no shortness of breath,no headache     56-year-old male with a history of TBI in the distant past as well as CVA with left-sided deficits now resolved presenting to the emergency department with dizziness for the last 3 days that has worsened today  Patient describes as the room spinning  It is worse when he moves his head or when he walks  No specific positions worsen it  He notes that he has decreased visual fields and the right side for the last several months  He has double vision when he looks to the left  He denies any weakness or numbness in the upper lower extremities  No difficulty with speech  No change in his speech pattern or difficulty understanding words  No recent fevers or chills, sinus congestion, URI symptoms  No nausea or vomiting  Patient states that he thinks the symptoms are secondary to his statin medication because he has had milder versions of this in the past since starting the statin 8 months ago  Prior to Admission Medications   Prescriptions Last Dose Informant Patient Reported? Taking? Multiple Vitamin (MULTIVITAMINS PO) 8/28/2019 at Unknown time  Yes Yes   Sig: Take by mouth   atorvastatin (LIPITOR) 40 mg tablet 8/28/2019 at Unknown time  Yes Yes   Sig: Take 40 mg by mouth daily   tiZANidine (ZANAFLEX) 4 mg tablet Past Week at Unknown time  No Yes   Sig: Take 1 tablet (4 mg total) by mouth daily at bedtime      Facility-Administered Medications: None       Past Medical History:   Diagnosis Date    Hyperlipidemia     Migraine     Stroke ([facility] [address] )     possibleTIA       Past Surgical History:   Procedure Laterality Date    FACIAL FRACTURE SURGERY         Family History   Problem Relation Age of Onset    Diabetes Mother      I have reviewed and agree with the history as documented      Social History     Tobacco Use    Smoking status: Former Smoker    Smokeless tobacco: Never Used   Substance Use Topics    Alcohol use: Not Currently    Drug use: No        Review of Systems   Constitutional: Negative for chills and fever  HENT: Negative for congestion, ear pain, sinus pressure, sinus pain and sore throat  Eyes: Positive for visual disturbance  Respiratory: Negative for cough and shortness of breath  Cardiovascular: Negative for chest pain and palpitations  Gastrointestinal: Negative for abdominal pain, diarrhea, nausea and vomiting  Genitourinary: Negative for difficulty urinating and dysuria  Musculoskeletal: Negative for myalgias  Skin: Negative for rash  Neurological: Positive for dizziness  Negative for weakness, light-headedness, numbness and headaches  Physical Exam  ED Triage Vitals [08/29/19 1932]   Temperature Pulse Respirations Blood Pressure SpO2   97 7 °F (36 5 °C) 58 18 143/86 97 %      Temp Source Heart Rate Source Patient Position - Orthostatic VS BP Location FiO2 (%)   Oral Monitor Lying Left arm --      Pain Score       No Pain             Orthostatic Vital Signs  Vitals:    08/29/19 1932 08/29/19 2041 08/29/19 2135 08/29/19 2341   BP: 143/86 134/76 132/76 129/85   Pulse: 58 58 (!) 54 57   Patient Position - Orthostatic VS: Lying Sitting Lying        Physical Exam   Constitutional: He is oriented to person, place, and time  He appears well-developed and well-nourished  No distress  HENT:   Head: Normocephalic and atraumatic  Mouth/Throat: Oropharynx is clear and moist    Eyes: Pupils are equal, round, and reactive to light  EOM are normal    Neck: Normal range of motion  Neck supple  Cardiovascular: Normal rate, regular rhythm, normal heart sounds and intact distal pulses  Pulmonary/Chest: Effort normal and breath sounds normal    Abdominal: Soft  Bowel sounds are normal  There is no tenderness  Musculoskeletal: Normal range of motion  He exhibits no edema     Neurological: He is alert and oriented to person, place, and time  He displays normal reflexes  A cranial nerve deficit is present  No sensory deficit  He exhibits normal muscle tone  Coordination normal    Subtle right-sided facial droop with left tongue deviation  Uvula is midline  Eyebrows raise equally  Patient reports normal sensation in the face  Normal hearing  His speech is fluent  The no difficulty rotating his head  No pronator drift, normal strength in bilateral upper extremities, normal strength bilateral lower extremities, normal heel to shin  Skin: Skin is warm and dry  Capillary refill takes less than 2 seconds  Nursing note and vitals reviewed        ED Medications  Medications   atorvastatin (LIPITOR) tablet 40 mg (has no administration in time range)   multivitamin-minerals (CENTRUM) tablet 1 tablet (has no administration in time range)   tiZANidine (ZANAFLEX) tablet 4 mg (has no administration in time range)   aspirin chewable tablet 81 mg (has no administration in time range)   enoxaparin (LOVENOX) subcutaneous injection 40 mg (has no administration in time range)   meclizine (ANTIVERT) tablet 25 mg (has no administration in time range)   iohexol (OMNIPAQUE) 350 MG/ML injection (MULTI-DOSE) 85 mL (85 mL Intravenous Given 8/29/19 2051)       Diagnostic Studies  Results Reviewed     Procedure Component Value Units Date/Time    Comprehensive metabolic panel [[id number]]  (Abnormal) Collected:  08/29/19 1950    Lab Status:  Final result Specimen:  Blood from Arm, Right Updated:  08/29/19 2042     Sodium 145 mmol/L      Potassium 3 9 mmol/L      Chloride 110 mmol/L      CO2 26 mmol/L      ANION GAP 9 mmol/L      BUN 10 mg/dL      Creatinine 0 89 mg/dL      Glucose 104 mg/dL      Calcium 9 4 mg/dL      AST 20 U/L      ALT 43 U/L      Alkaline Phosphatase 61 U/L      Total Protein 7 8 g/dL      Albumin 4 6 g/dL      Total Bilirubin 0 39 mg/dL      eGFR 102 ml/min/1 73sq m     Narrative:       [place] guidelines for Chronic Kidney Disease (CKD):     Stage 1 with normal or high GFR (GFR > 90 mL/min/1 73 square meters)    Stage 2 Mild CKD (GFR = 60-89 mL/min/1 73 square meters)    Stage 3A Moderate CKD (GFR = 45-59 mL/min/1 73 square meters)    Stage 3B Moderate CKD (GFR = 30-44 mL/min/1 73 square meters)    Stage 4 Severe CKD (GFR = 15-29 mL/min/1 73 square meters)    Stage 5 End Stage CKD (GFR <15 mL/min/1 73 square meters)  Note: GFR calculation is accurate only with a steady state creatinine    CBC and differential [[id number]]  (Abnormal) Collected:  08/29/19 1950    Lab Status:  Final result Specimen:  Blood from Arm, Right Updated:  08/29/19 1959     WBC 10 25 Thousand/uL      RBC 4 79 Million/uL      Hemoglobin 14 7 g/dL      Hematocrit 43 9 %      MCV 92 fL      MCH 30 7 pg      MCHC 33 5 g/dL      RDW 12 3 %      MPV 10 4 fL      Platelets 151 Thousands/uL      nRBC 0 /100 WBCs      Neutrophils Relative 68 %      Immat GRANS % 1 %      Lymphocytes Relative 21 %      Monocytes Relative 9 %      Eosinophils Relative 1 %      Basophils Relative 0 %      Neutrophils Absolute 7 02 Thousands/µL      Immature Grans Absolute 0 05 Thousand/uL      Lymphocytes Absolute 2 12 Thousands/µL      Monocytes Absolute 0 94 Thousand/µL      Eosinophils Absolute 0 08 Thousand/µL      Basophils Absolute 0 04 Thousands/µL                  CTA head and neck with and without contrast   Final Result by Rachel Barney MD (08/29 2126)         1  No evidence of acute infarct, intracranial hemorrhage or mass effect  2   No stenosis, dissection or occlusion of the carotid or vertebral arteries or major vessels of the [place]                    Workstation performed: [id number]         MRI Inpatient Order    (Results Pending)         Procedures  Procedures        ED Course             Stroke Assessment     Row Name 08/29/19 2003             NIH Stroke Scale    Interval  Baseline      Level of Consciousness (1a )  0      LOC Questions (1b )  0      LOC Commands (1c )  0      Best Gaze (2 )  0      Visual (3 )  1 Right sided visual field deficit      Facial Palsy (4 )  0      Motor Arm, Left (5a )  0      Motor Arm, Right (5b )  0      Motor Leg, Left (6a )  0      Motor Leg, Right (6b )  0      Limb Ataxia (7 )  0      Sensory (8 )  0      Best Language (9 )  0      Dysarthria (10 )  0      Extinction and Inattention (11 ) (Formerly Neglect)  0      Total  1          First Filed Value   TPA Decision  Patient not a TPA candidate  Patient is not a candidate options  Unclear time of onset outside appropriate time window , Symptoms resolved/clearly non disabling  MDM  Number of Diagnoses or Management Options  Suspected cerebrovascular accident (CVA):   Vertigo:   Diagnosis management comments: 25-year-old male presenting emergency department with vertigo and tongue deviation  He has a stagnant on exam concerning for central cause  Initial CTA was negative  His symptoms have been present for the last 3 days and therefore he does not qualify for tPA and a stroke alert was not called  Patient has a previous history of CVA as well though has never had an MRI to confirm this  Given the concerning symptoms patient was admitted for neurologic evaluation        Disposition  Final diagnoses:   Vertigo   Suspected cerebrovascular accident (CVA)     Time reflects when diagnosis was documented in both MDM as applicable and the Disposition within this note     Time User Action Codes Description Comment    8/29/2019 10:10 PM Jose Cheng Add [R42] Vertigo     8/29/2019 10:11 PM Neil Espiridion Rhein Add [R09 89] Unknown when suspected stroke patient was last well     8/29/2019 10:11 PM Neil Espiridion Rhein Remove [R09 89] Unknown when suspected stroke patient was last well     8/29/2019 10:11 PM Neil, Espiridion Rhein Add [R09 89] Suspected cerebrovascular accident (CVA)     8/29/2019 11:02 PM Nirmal Johnson Add [H53 2] Double vision     8/29/2019 11:03 PM Guille Query Add [R42] Dizziness       ED Disposition     ED Disposition Condition Date/Time Comment    Admit Stable Thu Aug 29, 2019 10:12 PM Case was discussed with VALERIE and the patient's admission status was agreed to be Admission Status: obs status to the service of Dr Juanita Benitez   Follow-up Information    None         Current Discharge Medication List      CONTINUE these medications which have NOT CHANGED    Details   atorvastatin (LIPITOR) 40 mg tablet Take 40 mg by mouth daily      Multiple Vitamin (MULTIVITAMINS PO) Take by mouth      tiZANidine (ZANAFLEX) 4 mg tablet Take 1 tablet (4 mg total) by mouth daily at bedtime  Qty: 30 tablet, Refills: 0    Associated Diagnoses: Myofascial pain syndrome           No discharge procedures on file  ED Provider  Attending physically available and evaluated Daniels Seek  I managed the patient along with the ED Attending      Electronically Signed by         Gopi Noe MD  08/30/19 5322

## 2019-08-30 ENCOUNTER — APPOINTMENT (OUTPATIENT)
Dept: NON INVASIVE DIAGNOSTICS | Facility: HOSPITAL | Age: 49
DRG: 149 | End: 2019-08-30
Payer: COMMERCIAL

## 2019-08-30 LAB
ALBUMIN SERPL BCP-MCNC: 4.2 G/DL (ref 3.5–5)
ALP SERPL-CCNC: 56 U/L (ref 46–116)
ALT SERPL W P-5'-P-CCNC: 38 U/L (ref 12–78)
ANION GAP SERPL CALCULATED.3IONS-SCNC: 8 MMOL/L (ref 4–13)
AST SERPL W P-5'-P-CCNC: 18 U/L (ref 5–45)
BILIRUB SERPL-MCNC: 0.53 MG/DL (ref 0.2–1)
BUN SERPL-MCNC: 12 MG/DL (ref 5–25)
CALCIUM SERPL-MCNC: 9.6 MG/DL (ref 8.3–10.1)
CHLORIDE SERPL-SCNC: 110 MMOL/L (ref 100–108)
CHOLEST SERPL-MCNC: 203 MG/DL (ref 50–200)
CO2 SERPL-SCNC: 26 MMOL/L (ref 21–32)
CREAT SERPL-MCNC: 0.89 MG/DL (ref 0.6–1.3)
CRP SERPL QL: <3 MG/L
ERYTHROCYTE [DISTWIDTH] IN BLOOD BY AUTOMATED COUNT: 12.3 % (ref 11.6–15.1)
ERYTHROCYTE [SEDIMENTATION RATE] IN BLOOD: 14 MM/HOUR (ref 0–10)
EST. AVERAGE GLUCOSE BLD GHB EST-MCNC: 123 MG/DL
GFR SERPL CREATININE-BSD FRML MDRD: 102 ML/MIN/1.73SQ M
GLUCOSE SERPL-MCNC: 100 MG/DL (ref 65–140)
HBA1C MFR BLD: 5.9 % (ref 4.2–6.3)
HCT VFR BLD AUTO: 43.8 % (ref 36.5–49.3)
HDLC SERPL-MCNC: 42 MG/DL (ref 40–60)
HGB BLD-MCNC: 14.6 G/DL (ref 12–17)
LDLC SERPL CALC-MCNC: 114 MG/DL (ref 0–100)
MCH RBC QN AUTO: 31 PG (ref 26.8–34.3)
MCHC RBC AUTO-ENTMCNC: 33.3 G/DL (ref 31.4–37.4)
MCV RBC AUTO: 93 FL (ref 82–98)
PLATELET # BLD AUTO: 242 THOUSANDS/UL (ref 149–390)
PMV BLD AUTO: 10.3 FL (ref 8.9–12.7)
POTASSIUM SERPL-SCNC: 3.8 MMOL/L (ref 3.5–5.3)
PROT SERPL-MCNC: 7.3 G/DL (ref 6.4–8.2)
RBC # BLD AUTO: 4.71 MILLION/UL (ref 3.88–5.62)
SODIUM SERPL-SCNC: 144 MMOL/L (ref 136–145)
TRIGL SERPL-MCNC: 233 MG/DL
WBC # BLD AUTO: 7.54 THOUSAND/UL (ref 4.31–10.16)

## 2019-08-30 PROCEDURE — G8987 SELF CARE CURRENT STATUS: HCPCS

## 2019-08-30 PROCEDURE — 97163 PT EVAL HIGH COMPLEX 45 MIN: CPT

## 2019-08-30 PROCEDURE — 93325 DOPPLER ECHO COLOR FLOW MAPG: CPT | Performed by: INTERNAL MEDICINE

## 2019-08-30 PROCEDURE — 80061 LIPID PANEL: CPT | Performed by: STUDENT IN AN ORGANIZED HEALTH CARE EDUCATION/TRAINING PROGRAM

## 2019-08-30 PROCEDURE — 93308 TTE F-UP OR LMTD: CPT

## 2019-08-30 PROCEDURE — G8978 MOBILITY CURRENT STATUS: HCPCS

## 2019-08-30 PROCEDURE — 93321 DOPPLER ECHO F-UP/LMTD STD: CPT | Performed by: INTERNAL MEDICINE

## 2019-08-30 PROCEDURE — 99252 IP/OBS CONSLTJ NEW/EST SF 35: CPT | Performed by: PSYCHIATRY & NEUROLOGY

## 2019-08-30 PROCEDURE — G8979 MOBILITY GOAL STATUS: HCPCS

## 2019-08-30 PROCEDURE — 93308 TTE F-UP OR LMTD: CPT | Performed by: INTERNAL MEDICINE

## 2019-08-30 PROCEDURE — 80307 DRUG TEST PRSMV CHEM ANLYZR: CPT | Performed by: PHYSICIAN ASSISTANT

## 2019-08-30 PROCEDURE — 83036 HEMOGLOBIN GLYCOSYLATED A1C: CPT | Performed by: STUDENT IN AN ORGANIZED HEALTH CARE EDUCATION/TRAINING PROGRAM

## 2019-08-30 PROCEDURE — 86140 C-REACTIVE PROTEIN: CPT | Performed by: INTERNAL MEDICINE

## 2019-08-30 PROCEDURE — 85027 COMPLETE CBC AUTOMATED: CPT | Performed by: STUDENT IN AN ORGANIZED HEALTH CARE EDUCATION/TRAINING PROGRAM

## 2019-08-30 PROCEDURE — 85652 RBC SED RATE AUTOMATED: CPT | Performed by: INTERNAL MEDICINE

## 2019-08-30 PROCEDURE — 80053 COMPREHEN METABOLIC PANEL: CPT | Performed by: STUDENT IN AN ORGANIZED HEALTH CARE EDUCATION/TRAINING PROGRAM

## 2019-08-30 PROCEDURE — G8988 SELF CARE GOAL STATUS: HCPCS

## 2019-08-30 PROCEDURE — 97166 OT EVAL MOD COMPLEX 45 MIN: CPT

## 2019-08-30 PROCEDURE — G8989 SELF CARE D/C STATUS: HCPCS

## 2019-08-30 RX ORDER — ATORVASTATIN CALCIUM 80 MG/1
80 TABLET, FILM COATED ORAL
Status: DISCONTINUED | OUTPATIENT
Start: 2019-08-30 | End: 2019-08-31 | Stop reason: HOSPADM

## 2019-08-30 RX ADMIN — ASPIRIN 81 MG 81 MG: 81 TABLET ORAL at 09:38

## 2019-08-30 RX ADMIN — Medication 1 TABLET: at 09:38

## 2019-08-30 RX ADMIN — ENOXAPARIN SODIUM 40 MG: 40 INJECTION SUBCUTANEOUS at 09:38

## 2019-08-30 RX ADMIN — ATORVASTATIN CALCIUM 80 MG: 80 TABLET, FILM COATED ORAL at 18:50
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
## 2019-08-30 NOTE — OCCUPATIONAL THERAPY NOTE
[address] Evaluation     Patient Name: Kendell Valdez  NCFSB'D Date: 8/30/2019  Problem List  Patient Active Problem List   Diagnosis    Rash    Contact dermatitis    [facility]    TBI [facility]traumatic [facility]) ([facility]    Polyneuropathy, peripheral sensorimotor axonal    Intervertebral disc disorder with radiculopathy of lumbosacral region    Dizziness    Double vision     Past Medical History  Past Medical History:   Diagnosis Date    Hyperlipidemia     Migraine     Stroke ([facility] )     Nika Brim     Past Surgical History  Past Surgical History:   Procedure Laterality Date    FACIAL FRACTURE SURGERY          08/30/19 1355   Note Type   Note type Eval only   Restrictions/Precautions   Weight Bearing Precautions Per Order No   Other Precautions Cognitive; Chair Alarm; Bed Alarm;Multiple lines; Fall Risk   Pain Assessment   Pain Assessment No/denies pain   Pain Score No Pain   Home Living   Additional Comments Pt resides in a house with his wife PTA  Pt's wife is able to help as necessary, but works  Prior Function   Level of [place] Independent with ADLs and functional mobility   Lives With Spouse   Receives Help From Family   ADL Assistance Independent   IADLs Independent   Falls in the last 6 months 0  (many near falls)   Vocational Full time employment   Lifestyle   Autonomy Pt reports being I with ADLs, IADLs, and functional mobility without use of DME PTA  Pt is a      Reciprocal Relationships supportive wife   Service to Others works FT in a [address] active PTA   [address] ([address] X   Patient Behaviors/Mood [place] affect   ADL   Eating Assistance 7  Independent   Grooming Assistance 5  Supervision/Setup   UB Bathing Assistance 5  Supervision/Setup   LB Bathing Assistance 5  Supervision/Setup   UB Dressing Assistance 5  Supervision/Setup   LB Dressing Assistance 4  Minimal [address]  5 Supervision/Setup   Bed Mobility   Supine to Sit 6  Modified independent   Sit to Supine 5  Supervision   Transfers   Sit to Stand 5  Supervision   Stand to Sit 5  Supervision   Functional Mobility   Functional Mobility 5  Supervision   Additional Comments reports increased dizziness with head position change   Balance   Static Sitting Fair +   Dynamic Sitting Fair   Static Standing Fair   Dynamic Standing Fair   Ambulatory Fair   Activity Tolerance   Activity Tolerance Patient tolerated treatment well;Patient limited by fatigue   Medical Staff Made Aware CM   Nurse Made Aware Pt cleared by RN for OT evaluation and OOB mobility   RUE Assessment   RUE Assessment WFL   LUE Assessment   LUE Assessment WFL   Hand Function   Gross Motor Coordination Functional  (mild ataxic gait)   Fine Motor Coordination Functional   Sensation   Light Touch No apparent deficits   Cognition   Overall Cognitive Status Impaired   Arousal/Participation Alert; Cooperative;Responsive   Attention Attends with cues to redirect   Orientation Level Oriented X4   Memory Unable to assess   Following Commands Follows one step commands with increased time or repetition   Comments Pt with baseline cognitive deficits 2* TBI and CVA   Assessment   Limitation Decreased self-care trans;Decreased high-level ADLs; Decreased Safe judgement during ADL;Decreased cognition   Prognosis Fair   Assessment Pt is a 53year old male seen for initial OT evaluation s/p admission to [facility] with dizziness and diplopia  PMHx includes: hyperlipidemia, TBI, and CVA  Pt with active OT orders and up with assistance orders  Pt cleared by RN for OT evaluation and OOB mobility  SHELLEY Gilbert present to assist with translation  Pt resides with his wife in a house PTA  Pt was I with ADLs, IADLs, and functional mobility without use of DME PTA  Pt is a   Pt is currently functioning at/close to his functional baseline and does not require continued OT services    From an OT standpoint, recommend outpatient OT, fitness to drive, and a shower chair upon d/c   OT orders to be d/c at this time  Please re-consult OT if medically/clinically appropriate     Goals   Patient Goals to go home   Recommendation   OT Discharge Recommendation Outpatient OT  (functional cog, FTD)   Equipment Recommended Tub seat with back   Barthel Index   Feeding 10   Bathing 0   Grooming Score 5   Dressing Score 5   Bladder Score 10   Bowels Score 10   Toilet Use Score 10   Transfers (Bed/Chair) Score 10   Mobility (Level Surface) Score 10   Stairs Score 0   Barthel Index Score 70     Beth Vargas, MOT, OTR/L

## 2019-08-30 NOTE — PLAN OF CARE
Problem: Potential for Falls  Goal: Patient will remain free of falls  Description  INTERVENTIONS:  - Assess patient frequently for physical needs  -  Identify cognitive and physical deficits and behaviors that affect risk of falls    -  [place] fall precautions as indicated by assessment   - Educate patient/family on patient safety including physical limitations  - Instruct patient to call for assistance with activity based on assessment  - Modify environment to reduce risk of injury  - Consider OT/PT consult to assist with strengthening/mobility  Outcome: Progressing     Problem: PAIN - ADULT  Goal: Verbalizes/displays adequate comfort level or baseline comfort level  Description  Interventions:  - Encourage patient to monitor pain and request assistance  - Assess pain using appropriate pain scale  - Administer analgesics based on type and severity of pain and evaluate response  - Implement non-pharmacological measures as appropriate and evaluate response  - Consider cultural and social influences on pain and pain management  - Notify physician/advanced practitioner if interventions unsuccessful or patient reports new pain  Outcome: Progressing     Problem: SAFETY ADULT  Goal: Maintain or return to baseline ADL function  Description  INTERVENTIONS:  -  Assess patient's ability to carry out ADLs; assess patient's baseline for ADL function and identify physical deficits which impact ability to perform ADLs (bathing, care of mouth/teeth, toileting, grooming, dressing, etc )  - Assess/evaluate cause of self-care deficits   - Assess range of motion  - Assess patient's mobility; develop plan if impaired  - Assess patient's need for assistive devices and provide as appropriate  - Encourage maximum independence but intervene and supervise when necessary  - Involve family in performance of ADLs  - Assess for home care needs following discharge   - Consider OT consult to assist with ADL evaluation and planning for discharge  - Provide patient education as appropriate  Outcome: Progressing  Goal: Maintain or return mobility status to optimal level  Description  INTERVENTIONS:  - Assess patient's baseline mobility status (ambulation, transfers, stairs, etc )    - Identify cognitive and physical deficits and behaviors that affect mobility  - Identify mobility aids required to assist with transfers and/or ambulation (gait belt, sit-to-stand, lift, walker, cane, etc )  - [place] fall precautions as indicated by assessment  - Record patient progress and toleration of activity level on Mobility SBAR; progress patient to next Phase/Stage  - Instruct patient to call for assistance with activity based on assessment  - Consider rehabilitation consult to assist with strengthening/weightbearing, etc   Outcome: Progressing     Problem: DISCHARGE PLANNING  Goal: Discharge to home or other facility with appropriate resources  Description  INTERVENTIONS:  - Identify barriers to discharge w/patient and caregiver  - Arrange for needed discharge resources and transportation as appropriate  - Identify discharge learning needs (meds, wound care, etc )  - Arrange for interpretive services to assist at discharge as needed  - Refer to Case Management Department for coordinating discharge planning if the patient needs post-hospital services based on physician/advanced practitioner order or complex needs related to functional status, cognitive ability, or social support system  Outcome: Progressing     Problem: Knowledge Deficit  Goal: Patient/family/caregiver demonstrates understanding of disease process, treatment plan, medications, and discharge instructions  Description  Complete learning assessment and assess knowledge base    Interventions:  - Provide teaching at level of understanding  - Provide teaching via preferred learning methods  Outcome: Progressing     Problem: NEUROSENSORY - ADULT  Goal: Achieves stable or improved neurological status  Description  INTERVENTIONS  - Monitor and report changes in neurological status  - Monitor vital signs such as temperature, blood pressure, glucose, and any other labs ordered   - Initiate measures to prevent increased intracranial pressure       Outcome: Progressing  Goal: Achieves maximal functionality and self care  Description  INTERVENTIONS  - Monitor swallowing and airway patency with patient fatigue and changes in neurological status  - Encourage and assist patient to increase activity and self care  - Encourage visually impaired, hearing impaired and aphasic patients to use assistive/communication devices  Outcome: Progressing     Problem: Neurological Deficit  Goal: Neurological status is stable or improving  Description  Interventions:  - Monitor and assess patient's level of consciousness, motor function, sensory function, and level of assistance needed for ADLs  - Monitor and report changes from baseline  Collaborate with interdisciplinary team to initiate plan and implement interventions as ordered  - Provide and maintain a safe environment  - Consider seizure precautions  - Consider fall precautions  - Consider aspiration precautions  - Consider bleeding precautions    Outcome: Progressing

## 2019-08-30 NOTE — RESTORATIVE TECHNICIAN NOTE
Restorative Specialist Mobility Note       Activity: Stand at bedside, Ambulate in pena, Ambulate in room, [place] privileges, Dangle(Educated/encouraged pt to ambulate with assistance 3-4 x's/day  Bed alarm on   Pt callbell, phone/tray within reach )     Assistive Device: None       Flex SCHAEFER, Restorative Technician, [employer]
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
## 2019-08-30 NOTE — SOCIAL WORK
Met with pt and explained Cm role  Pt requested CM to contact his wife Ozzy King to complete CM role  Cm contacted pt's wife Ozzy King, contact # [phone] to complete Cm assessment  Pt lives with his wife in a 2 story house with 1 BROOK and BR and BD on 2nd floor with bathroom available on 1st floor  Pt was independent PTA and was able to drive  No DME avail  Pt has a hx of [facility] VNA and a hx of rehab at [facility]  No hx of mental health issues or drug or alcohol use  Pt has a prescription plan and uses [facility] in [address] [place] [address] for his prescriptions  Primary contact is pt's wife Rob , contact # [phone]  Pt's wife will provide pt with transportation home upon discharge  Pt does not have a POA  Informed her of therapy's recommendation for outpt therapy  She was agreeable  Outpt therapy script and locations provided to pt  Informed her of OT recommendation for shower chair and she was agreeable  informed her of locations to purchase a shower chair as it is not covered by insurance  CM reviewed d/c planning process including the following: identifying help at home, patient preference for d/c planning needs, Discharge Lounge, [facility] Meds to Bed program, availability of treatment team to discuss questions or concerns patient and/or family may have regarding understanding medications and recognizing signs and symptoms once discharged  CM also encouraged patient to follow up with all recommended appointments after discharge  Patient advised of importance for patient and family to participate in managing patients medical well being  Patient/caregiver received discharge checklist  Content reviewed  Patient/caregiver encouraged to participate in discharge plan of care prior to discharge home

## 2019-08-30 NOTE — PLAN OF CARE
Problem: Potential for Falls  Goal: Patient will remain free of falls  Description  INTERVENTIONS:  - Assess patient frequently for physical needs  -  Identify cognitive and physical deficits and behaviors that affect risk of falls    -  [place] fall precautions as indicated by assessment   - Educate patient/family on patient safety including physical limitations  - Instruct patient to call for assistance with activity based on assessment  - Modify environment to reduce risk of injury  - Consider OT/PT consult to assist with strengthening/mobility  Outcome: Progressing     Problem: PAIN - ADULT  Goal: Verbalizes/displays adequate comfort level or baseline comfort level  Description  Interventions:  - Encourage patient to monitor pain and request assistance  - Assess pain using appropriate pain scale  - Administer analgesics based on type and severity of pain and evaluate response  - Implement non-pharmacological measures as appropriate and evaluate response  - Consider cultural and social influences on pain and pain management  - Notify physician/advanced practitioner if interventions unsuccessful or patient reports new pain  Outcome: Progressing     Problem: SAFETY ADULT  Goal: Maintain or return to baseline ADL function  Description  INTERVENTIONS:  -  Assess patient's ability to carry out ADLs; assess patient's baseline for ADL function and identify physical deficits which impact ability to perform ADLs (bathing, care of mouth/teeth, toileting, grooming, dressing, etc )  - Assess/evaluate cause of self-care deficits   - Assess range of motion  - Assess patient's mobility; develop plan if impaired  - Assess patient's need for assistive devices and provide as appropriate  - Encourage maximum independence but intervene and supervise when necessary  - Involve family in performance of ADLs  - Assess for home care needs following discharge   - Consider OT consult to assist with ADL evaluation and planning for discharge  - Provide patient education as appropriate  Outcome: Progressing  Goal: Maintain or return mobility status to optimal level  Description  INTERVENTIONS:  - Assess patient's baseline mobility status (ambulation, transfers, stairs, etc )    - Identify cognitive and physical deficits and behaviors that affect mobility  - Identify mobility aids required to assist with transfers and/or ambulation (gait belt, sit-to-stand, lift, walker, cane, etc )  - [place] fall precautions as indicated by assessment  - Record patient progress and toleration of activity level on Mobility SBAR; progress patient to next Phase/Stage  - Instruct patient to call for assistance with activity based on assessment  - Consider rehabilitation consult to assist with strengthening/weightbearing, etc   Outcome: Progressing     Problem: DISCHARGE PLANNING  Goal: Discharge to home or other facility with appropriate resources  Description  INTERVENTIONS:  - Identify barriers to discharge w/patient and caregiver  - Arrange for needed discharge resources and transportation as appropriate  - Identify discharge learning needs (meds, wound care, etc )  - Arrange for interpretive services to assist at discharge as needed  - Refer to Case Management Department for coordinating discharge planning if the patient needs post-hospital services based on physician/advanced practitioner order or complex needs related to functional status, cognitive ability, or social support system  Outcome: Progressing     Problem: Knowledge Deficit  Goal: Patient/family/caregiver demonstrates understanding of disease process, treatment plan, medications, and discharge instructions  Description  Complete learning assessment and assess knowledge base    Interventions:  - Provide teaching at level of understanding  - Provide teaching via preferred learning methods  Outcome: Progressing     Problem: NEUROSENSORY - ADULT  Goal: Achieves stable or improved neurological status  Description  INTERVENTIONS  - Monitor and report changes in neurological status  - Monitor vital signs such as temperature, blood pressure, glucose, and any other labs ordered   - Initiate measures to prevent increased intracranial pressure     Outcome: Progressing  Goal: Remains free of injury related to seizures activity  Description       Outcome: Progressing  Goal: Achieves maximal functionality and self care  Description  INTERVENTIONS  - Monitor swallowing and airway patency with patient fatigue and changes in neurological status  - Encourage and assist patient to increase activity and self care  - Encourage visually impaired, hearing impaired and aphasic patients to use assistive/communication devices  Outcome: Progressing     Problem: Neurological Deficit  Goal: Neurological status is stable or improving  Description  Interventions:  - Monitor and assess patient's level of consciousness, motor function, sensory function, and level of assistance needed for ADLs  - Monitor and report changes from baseline  Collaborate with interdisciplinary team to initiate plan and implement interventions as ordered  - Provide and maintain a safe environment  - Consider seizure precautions  - Consider fall precautions  - Consider aspiration precautions  - Consider bleeding precautions    Outcome: Progressing
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
## 2019-08-30 NOTE — PHYSICAL THERAPY NOTE
Physical Therapy Evaluation    Patient's Name: Gladys Gongora    Admitting Diagnosis  Double vision [H53 2]  Dizziness [R42]  Vertigo [R42]  Suspected cerebrovascular accident (CVA) [R09 89]    Problem List  Patient Active Problem List   Diagnosis    Rash    Contact dermatitis    [facility] )    TBI (traumatic brain injury) ([place] [address] )    Polyneuropathy, peripheral sensorimotor axonal    Intervertebral disc disorder with radiculopathy of lumbosacral region    Dizziness    Double vision       Past Medical History  Past Medical History:   Diagnosis Date    Hyperlipidemia     Migraine     Stroke ([place] [address] )     Estrellita Gong       Past Surgical History  Past Surgical History:   Procedure Laterality Date    FACIAL FRACTURE SURGERY          08/30/19 0835   Note Type   Note type Eval only   Pain Assessment   Pain Assessment 0-10   Pain Score 2   Pain Type Acute pain;Chronic pain   Pain Location Head;Back   Patient's Stated Pain Goal No pain   Hospital Pain Intervention(s) Ambulation/increased activity   Response to Interventions unchanged   Home Living   Type of Home House   Additional Comments Resides w/ wife in home  Normally indep, works FT in [email or web address]  Wife works as well  adrive, ambulates w/ out device   Prior Function   Level of [place] Independent with ADLs and functional mobility   Falls in the last 6 months 0  (wife notes several near falls)   Restrictions/Precautions   Weight Bearing Precautions Per Order No   Other Precautions Cognitive; Bed Alarm; Chair Alarm;Multiple lines; Fall Risk;Pain   General   Family/Caregiver Present Yes  (wife)   Cognition   Overall Cognitive Status Impaired   Arousal/Participation Responsive   Orientation Level Oriented X4   Memory Unable to assess   Following Commands Follows one step commands without difficulty   Comments wife present to translate, as pt [place] speaking only    some baseline cog issues due to prior TBI   RLE Assessment   RLE Assessment   (strength grossly 4/5)   LLE Assessment   LLE Assessment   (strength grossly 4/5)   Coordination   Movements are Fluid and Coordinated 0   Coordination and Movement Description mild B LE ataxia   Bed Mobility   Supine to Sit 5  Supervision   Additional items Assist x 1   Sit to Supine 5  Supervision   Additional items Assist x 1   Transfers   Sit to Stand 5  Supervision   Additional items Assist x 1   Stand to Sit 5  Supervision   Additional items Assist x 1   Ambulation/Elevation   Gait pattern   (mild ataxia, 2 self corrected LOB- c/o dizziness)   Gait Assistance   (CGA)   Additional items Assist x 1   Assistive Device None   Distance 100'   Balance   Static Sitting Normal   Dynamic Sitting Good   Static Standing Fair   Dynamic Standing Fair -   Ambulatory Fair -   Endurance Deficit   Endurance Deficit Yes   Endurance Deficit Description fatigue, weakness,pain   Activity Tolerance   Activity Tolerance Patient limited by fatigue;Patient limited by pain;Treatment limited secondary to medical complications (Comment)   Nurse Made Aware yes   Assessment   Prognosis Good   Problem List Decreased strength;Decreased endurance;Decreased mobility; Impaired balance;Decreased coordination; Impaired vision  (c/o dizziness, double vision w/ L head turns)   Assessment Pt seen for high complexity physical therapy evaluation  Pt is a 54 y/o male w/ history of HLD, TBI,CVA, migraines who is now admitted w/ dizziness x 3 days, double vision w/ l sided head turns  Dx is r/o CVA, CTA -  Other w/u pending  Due to acute medical issues, unclear medical dx, fall risk, note unstable clinical picture  PT consulted to assess mobility, d/c needs  Pt presents w/ decreased functional mob, standing balance, endurance, B LE strength and coordination, barriers at home  will benefit from skilled PT to correct for the above problems    recomemnd home w  OP PT when stable   Goals   Patient Goals to feel better   STG Expiration Date 09/13/19   Short Term Goal #1 1-2 wks: bed mob and transfers w/ indep, standing balance to good/normal, ambulate 200-300 ft w/ indep, increase B LE strength by 1/2 -1 grade, ambulate 1 flight of stairs w/ indep   Treatment Day 0   Plan   Treatment/Interventions Functional transfer training;LE strengthening/ROM; Elevations; Endurance training; Therapeutic exercise;Patient/family training;Equipment eval/education; Bed mobility;Gait training   PT Frequency   (3-5x/wk)   Recommendation   Recommendation   (home w/ OP PT)   PT - OK to Discharge Yes  (when stable to home w/ OP PT)   Modified Morgan Scale   Modified Morgan Scale 4   Barthel Index   Feeding 10   Bathing 0   Grooming Score 5   Dressing Score 5   Bladder Score 10   Bowels Score 10   Toilet Use Score 10   Transfers (Bed/Chair) Score 10   Mobility (Level Surface) Score 0   Stairs Score 0   Barthel Index Score 60           Kim Kaur PT, DPT, CSRS

## 2019-08-30 NOTE — PLAN OF CARE
Problem: PHYSICAL THERAPY ADULT  Goal: Performs mobility at highest level of function for planned discharge setting  See evaluation for individualized goals  Description  Treatment/Interventions: Functional transfer training, LE strengthening/ROM, Elevations, Endurance training, Therapeutic exercise, Patient/family training, Equipment eval/education, Bed mobility, Gait training          See flowsheet documentation for full assessment, interventions and recommendations  Note:   Prognosis: Good  Problem List: Decreased strength, Decreased endurance, Decreased mobility, Impaired balance, Decreased coordination, Impaired vision(c/o dizziness, double vision w/ L head turns)  Assessment: Pt seen for high complexity physical therapy evaluation  Pt is a 54 y/o male w/ history of HLD, TBI,CVA, migraines who is now admitted w/ dizziness x 3 days, double vision w/ l sided head turns  Dx is r/o CVA, CTA -  Other w/u pending  Due to acute medical issues, unclear medical dx, fall risk, note unstable clinical picture  PT consulted to assess mobility, d/c needs  Pt presents w/ decreased functional mob, standing balance, endurance, B LE strength and coordination, barriers at home  will benefit from skilled PT to correct for the above problems  recomemnd home w  OP PT when stable        Recommendation: (home w/ OP PT)     PT - OK to Discharge: (S) Yes(when stable to home w/ OP PT)    See flowsheet documentation for full assessment

## 2019-08-30 NOTE — ED ATTENDING ATTESTATION
Kam Luis MD, saw and evaluated the patient  I have discussed the patient with the resident/non-physician practitioner and agree with the resident's/non-physician practitioner's findings, Plan of Care, and MDM as documented in the resident's/non-physician practitioner's note, except where noted  All available labs and Radiology studies were reviewed  I was present for key portions of any procedure(s) performed by the resident/non-physician practitioner and I was immediately available to provide assistance  At this point I agree with the current assessment done in the Emergency Department  I have conducted an independent evaluation of this patient a history and physical is as follows:  Pt states that he ahs been dizzy for 3 days some r posterior neck pain double vision looking to L   PE: alert + nystagmus both vertical and on r gaze tongue pointing to r ? r facial droop motor 5/5 sensation wnl MDM: will do ct cta discuss with neuro    Critical Care Time  Procedures

## 2019-08-31 ENCOUNTER — APPOINTMENT (OUTPATIENT)
Dept: RADIOLOGY | Facility: HOSPITAL | Age: 49
DRG: 149 | End: 2019-08-31
Payer: COMMERCIAL

## 2019-08-31 VITALS
BODY MASS INDEX: 27.16 KG/M2 | WEIGHT: 194 LBS | OXYGEN SATURATION: 98 % | HEIGHT: 71 IN | HEART RATE: 64 BPM | DIASTOLIC BLOOD PRESSURE: 77 MMHG | TEMPERATURE: 98 F | RESPIRATION RATE: 17 BRPM | SYSTOLIC BLOOD PRESSURE: 119 MMHG

## 2019-08-31 PROBLEM — E78.5 HYPERLIPEMIA: Status: ACTIVE | Noted: 2019-08-31

## 2019-08-31 LAB
AMPHETAMINES UR QL SCN: NEGATIVE NG/ML
ANION GAP SERPL CALCULATED.3IONS-SCNC: 9 MMOL/L (ref 4–13)
BARBITURATES UR QL SCN: NEGATIVE NG/ML
BASOPHILS # BLD AUTO: 0.03 THOUSANDS/ΜL (ref 0–0.1)
BASOPHILS NFR BLD AUTO: 0 % (ref 0–1)
BENZODIAZ UR QL: NEGATIVE NG/ML
BUN SERPL-MCNC: 11 MG/DL (ref 5–25)
BZE UR QL: NEGATIVE NG/ML
CALCIUM SERPL-MCNC: 9.2 MG/DL (ref 8.3–10.1)
CANNABINOIDS UR QL SCN: NEGATIVE NG/ML
CHLORIDE SERPL-SCNC: 109 MMOL/L (ref 100–108)
CO2 SERPL-SCNC: 25 MMOL/L (ref 21–32)
CREAT SERPL-MCNC: 0.82 MG/DL (ref 0.6–1.3)
EOSINOPHIL # BLD AUTO: 0.14 THOUSAND/ΜL (ref 0–0.61)
EOSINOPHIL NFR BLD AUTO: 2 % (ref 0–6)
ERYTHROCYTE [DISTWIDTH] IN BLOOD BY AUTOMATED COUNT: 12.3 % (ref 11.6–15.1)
GFR SERPL CREATININE-BSD FRML MDRD: 105 ML/MIN/1.73SQ M
GLUCOSE P FAST SERPL-MCNC: 107 MG/DL (ref 65–99)
GLUCOSE SERPL-MCNC: 107 MG/DL (ref 65–140)
HCT VFR BLD AUTO: 45.6 % (ref 36.5–49.3)
HGB BLD-MCNC: 15.5 G/DL (ref 12–17)
IMM GRANULOCYTES # BLD AUTO: 0.05 THOUSAND/UL (ref 0–0.2)
IMM GRANULOCYTES NFR BLD AUTO: 1 % (ref 0–2)
LYMPHOCYTES # BLD AUTO: 2.16 THOUSANDS/ΜL (ref 0.6–4.47)
LYMPHOCYTES NFR BLD AUTO: 28 % (ref 14–44)
MCH RBC QN AUTO: 31.3 PG (ref 26.8–34.3)
MCHC RBC AUTO-ENTMCNC: 34 G/DL (ref 31.4–37.4)
MCV RBC AUTO: 92 FL (ref 82–98)
METHADONE UR QL SCN: NEGATIVE NG/ML
MONOCYTES # BLD AUTO: 0.72 THOUSAND/ΜL (ref 0.17–1.22)
MONOCYTES NFR BLD AUTO: 9 % (ref 4–12)
NEUTROPHILS # BLD AUTO: 4.57 THOUSANDS/ΜL (ref 1.85–7.62)
NEUTS SEG NFR BLD AUTO: 60 % (ref 43–75)
NRBC BLD AUTO-RTO: 0 /100 WBCS
OPIATES UR QL: NEGATIVE NG/ML
PCP UR QL: NEGATIVE NG/ML
PLATELET # BLD AUTO: 250 THOUSANDS/UL (ref 149–390)
PMV BLD AUTO: 10.6 FL (ref 8.9–12.7)
POTASSIUM SERPL-SCNC: 3.7 MMOL/L (ref 3.5–5.3)
PROPOXYPH UR QL SCN: NEGATIVE NG/ML
RBC # BLD AUTO: 4.95 MILLION/UL (ref 3.88–5.62)
SODIUM SERPL-SCNC: 143 MMOL/L (ref 136–145)
WBC # BLD AUTO: 7.67 THOUSAND/UL (ref 4.31–10.16)

## 2019-08-31 PROCEDURE — 85025 COMPLETE CBC W/AUTO DIFF WBC: CPT | Performed by: INTERNAL MEDICINE

## 2019-08-31 PROCEDURE — 82390 ASSAY OF CERULOPLASMIN: CPT | Performed by: INTERNAL MEDICINE

## 2019-08-31 PROCEDURE — 80048 BASIC METABOLIC PNL TOTAL CA: CPT | Performed by: INTERNAL MEDICINE

## 2019-08-31 PROCEDURE — 70551 MRI BRAIN STEM W/O DYE: CPT

## 2019-08-31 PROCEDURE — 99232 SBSQ HOSP IP/OBS MODERATE 35: CPT | Performed by: PSYCHIATRY & NEUROLOGY

## 2019-08-31 RX ORDER — MECLIZINE HYDROCHLORIDE 25 MG/1
25 TABLET ORAL EVERY 8 HOURS PRN
Qty: 30 TABLET | Refills: 0 | Status: SHIPPED | OUTPATIENT
Start: 2019-08-31

## 2019-08-31 RX ORDER — ASPIRIN 81 MG/1
81 TABLET, CHEWABLE ORAL DAILY
Qty: 30 TABLET | Refills: 1 | Status: SHIPPED | OUTPATIENT
Start: 2019-09-01 | End: 2021-07-27

## 2019-08-31 RX ORDER — DIVALPROEX SODIUM 250 MG/1
250 TABLET, DELAYED RELEASE ORAL
Status: DISCONTINUED | OUTPATIENT
Start: 2019-08-31 | End: 2019-08-31

## 2019-08-31 RX ADMIN — ENOXAPARIN SODIUM 40 MG: 40 INJECTION SUBCUTANEOUS at 10:12

## 2019-08-31 RX ADMIN — VALPROATE SODIUM 500 MG: 100 INJECTION, SOLUTION INTRAVENOUS at 10:22

## 2019-08-31 RX ADMIN — ASPIRIN 81 MG 81 MG: 81 TABLET ORAL at 10:12

## 2019-08-31 RX ADMIN — Medication 1 TABLET: at 10:12

## 2019-08-31 NOTE — PROGRESS NOTES
Progress Note - Neurology   Amy Arnold 53 y o  male MRN: [id number]  Unit/Bed#: [facility] 703-01 Encounter: [id number]    Assessment/Plan:  20-year-old man with a history of TBI with some residual cognitive deficits presents for dizziness and double vision for the past week  Wife was present in room today who clarified history/timeline  Apparently patient started reporting dizziness and double vision on Monday of last week  Symptoms progressed throughout the week and were worse with far left gaze  Symptoms peaked on Thursday and subsequently improved over Friday and today  Patient still has some double vision on far left gaze however denies current dizziness  - 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  - suspect diplopia is triggering a mild sense of dizziness  - CTA head and neck was unremarkable for any aneurysm or other contributory pathology  -MRI brain was also unremarkable for any acute intracranial pathology, there was evidence of prior right frontal shunt placement  - etiology of 3rd nerve palsy is likely vascular  - continue aspirin 81 mg and Lipitor 40 mg   - no additional neurologic recommendations at this time  -patient already established with outpatient neurology, recommend keeping next appointment with Dr Dvaid Junior    - okay to discharge patient from a neurologic standpoint   - please call questions/concerns      Subjective:   "I feel better, dizziness is gone, still with double vision when I look left"    ROS:  Review of Systems   Constitutional: Negative  HENT: Negative for hearing loss  Eyes: Negative for photophobia positive visual disturbance as described above  Respiratory: Negative for wheezing  Cardiovascular: Negative for chest pain and palpitations  Genitourinary: Negative for dysuria and urgency  Neurological: Negative for dizziness, weakness, light-headedness, numbness and headaches  All other systems reviewed are negative      Vitals: Blood pressure 119/77, pulse 64, temperature 98 °F (36 7 °C), resp  rate 17, height 5' 11" (1 803 m), weight 88 kg (194 lb), SpO2 98 %  ,Body mass index is 27 06 kg/m²  Physical Exam:   Physical Exam   Constitutional: oriented to person, place, and time  appears well-developed and well-nourished  HENT:  Unremarkable  Head: Normocephalic and atraumatic  Eyes: EOM are normal  Pupils are equal, round, and reactive to light  Cardiovascular: Normal rate, regular rhythm and normal heart sounds     No murmur heard  Pulmonary/Chest: Breath sounds normal       Neurologic Exam   Mental Status    Oriented to person, place, and time  Level of consciousness: alert  Normal comprehension  Cranial Nerves    Visual fields partially decreased in the left temporal region  Pupils are equal, round, and reactive to light  Extraocular motions are consistent with a partial right CN 3 palsy  Diplopia is worse with looking left and upward  Nystagmus:  Positive, on left and upward gaze,  fast phase to the left, more pronounced in right versus left eye  Decreased sensation in the left face, slightly widened palpebral fissure and flattening of the nasolabial fold on the right  Tongue deviates right  Motor Exam   5/5 strength in all four extremities, slight pronation of the left upper extremity  Muscle bulk: normal  Overall muscle tone: normal  Sensory Exam   Sensation intact to light touch and temp  Gait, Coordination, and Reflexes   DTR's symmetric, 2+ in the bilateral upper extremities, 3+ in the bilateral lower extremities, toes upgoing bilaterally  No gross ataxia  Lab, Imaging and other studies: I have personally reviewed pertinent reports      VTE Prophylaxis: Sequential compression device (Venodyne)

## 2019-08-31 NOTE — UTILIZATION REVIEW
Please note this is now for an INPATIENT request  Send all determinations to our UR Dept Fax [phone]    Notification of Inpatient Admission/Inpatient Authorization Request  This is a Notification of Inpatient Admission/Request for Inpatient Authorization for our facility [place] [address]  Be advised that this patient was admitted to our facility under Inpatient Status  Please contact the Utilization Review Department where the patient is receiving care services for additional admission information  Place of Service Code: 24   Place of Service Name: Inpatient Hospital  Presentation Date & Time: 8/29/2019  7:29 PM  Inpatient Admission Date & Time: 8/31/19 1006  Discharge Date & Time: 8/31/2019  2:55 PM   Discharge Disposition (if discharged): Home/Self Care  Attending Physician: Remberto Tripathi, [address] [[id number]]  Admission Orders (From admission, onward)     Ordered        08/31/19 1006  Inpatient Admission  Once         08/29/19 2212  Place in Observation  Once                   Facility: [address])  [facility] Utilization Review Department  Phone: [phone]; Fax [phone]  [email or web address]  ATTENTION: Please call with any questions or concerns to [phone]  and carefully listen to the prompts so that you are directed to the right person  Send all requests for admission clinical reviews, approved or denied determinations and any other requests to fax [phone]   All voicemails are confidential

## 2019-08-31 NOTE — UTILIZATION REVIEW
OBSERVATION 8/29/19 @2212 CONVERTED TO INPATIENT 8/31/19  @1006  DUE STROKE WORKUP, RIGHT SIDED FACIAL DROOP   08/31/19 1007  Inpatient Admission Once     Transfer Service: General Medicine       Question Answer Comment   Admitting Physician Navjot Gordillo    Level of Care Med Surg    Estimated length of stay More than 2 Midnights    Certification I certify that inpatient services are medically necessary for this patient for a duration of greater than two midnights  See H&P and MD Progress Notes for additional information about the patient's course of treatment          08/31/19 1006

## 2019-08-31 NOTE — PLAN OF CARE
Problem: Potential for Falls  Goal: Patient will remain free of falls  Description  INTERVENTIONS:  - Assess patient frequently for physical needs  -  Identify cognitive and physical deficits and behaviors that affect risk of falls    -  [place] fall precautions as indicated by assessment   - Educate patient/family on patient safety including physical limitations  - Instruct patient to call for assistance with activity based on assessment  - Modify environment to reduce risk of injury  - Consider OT/PT consult to assist with strengthening/mobility  Outcome: Progressing     Problem: PAIN - ADULT  Goal: Verbalizes/displays adequate comfort level or baseline comfort level  Description  Interventions:  - Encourage patient to monitor pain and request assistance  - Assess pain using appropriate pain scale  - Administer analgesics based on type and severity of pain and evaluate response  - Implement non-pharmacological measures as appropriate and evaluate response  - Consider cultural and social influences on pain and pain management  - Notify physician/advanced practitioner if interventions unsuccessful or patient reports new pain  Outcome: Progressing     Problem: SAFETY ADULT  Goal: Maintain or return to baseline ADL function  Description  INTERVENTIONS:  -  Assess patient's ability to carry out ADLs; assess patient's baseline for ADL function and identify physical deficits which impact ability to perform ADLs (bathing, care of mouth/teeth, toileting, grooming, dressing, etc )  - Assess/evaluate cause of self-care deficits   - Assess range of motion  - Assess patient's mobility; develop plan if impaired  - Assess patient's need for assistive devices and provide as appropriate  - Encourage maximum independence but intervene and supervise when necessary  - Involve family in performance of ADLs  - Assess for home care needs following discharge   - Consider OT consult to assist with ADL evaluation and planning for discharge  - Provide patient education as appropriate  Outcome: Progressing  Goal: Maintain or return mobility status to optimal level  Description  INTERVENTIONS:  - Assess patient's baseline mobility status (ambulation, transfers, stairs, etc )    - Identify cognitive and physical deficits and behaviors that affect mobility  - Identify mobility aids required to assist with transfers and/or ambulation (gait belt, sit-to-stand, lift, walker, cane, etc )  - [place] fall precautions as indicated by assessment  - Record patient progress and toleration of activity level on Mobility SBAR; progress patient to next Phase/Stage  - Instruct patient to call for assistance with activity based on assessment  - Consider rehabilitation consult to assist with strengthening/weightbearing, etc   Outcome: Progressing     Problem: DISCHARGE PLANNING  Goal: Discharge to home or other facility with appropriate resources  Description  INTERVENTIONS:  - Identify barriers to discharge w/patient and caregiver  - Arrange for needed discharge resources and transportation as appropriate  - Identify discharge learning needs (meds, wound care, etc )  - Arrange for interpretive services to assist at discharge as needed  - Refer to Case Management Department for coordinating discharge planning if the patient needs post-hospital services based on physician/advanced practitioner order or complex needs related to functional status, cognitive ability, or social support system  Outcome: Progressing     Problem: Knowledge Deficit  Goal: Patient/family/caregiver demonstrates understanding of disease process, treatment plan, medications, and discharge instructions  Description  Complete learning assessment and assess knowledge base    Interventions:  - Provide teaching at level of understanding  - Provide teaching via preferred learning methods  Outcome: Progressing     Problem: SAFETY ADULT  Goal: Maintain or return to baseline ADL function  Description  INTERVENTIONS:  -  Assess patient's ability to carry out ADLs; assess patient's baseline for ADL function and identify physical deficits which impact ability to perform ADLs (bathing, care of mouth/teeth, toileting, grooming, dressing, etc )  - Assess/evaluate cause of self-care deficits   - Assess range of motion  - Assess patient's mobility; develop plan if impaired  - Assess patient's need for assistive devices and provide as appropriate  - Encourage maximum independence but intervene and supervise when necessary  - Involve family in performance of ADLs  - Assess for home care needs following discharge   - Consider OT consult to assist with ADL evaluation and planning for discharge  - Provide patient education as appropriate  Outcome: Progressing  Goal: Maintain or return mobility status to optimal level  Description  INTERVENTIONS:  - Assess patient's baseline mobility status (ambulation, transfers, stairs, etc )    - Identify cognitive and physical deficits and behaviors that affect mobility  - Identify mobility aids required to assist with transfers and/or ambulation (gait belt, sit-to-stand, lift, walker, cane, etc )  - [place] fall precautions as indicated by assessment  - Record patient progress and toleration of activity level on Mobility SBAR; progress patient to next Phase/Stage  - Instruct patient to call for assistance with activity based on assessment  - Consider rehabilitation consult to assist with strengthening/weightbearing, etc   Outcome: Progressing     Problem: DISCHARGE PLANNING  Goal: Discharge to home or other facility with appropriate resources  Description  INTERVENTIONS:  - Identify barriers to discharge w/patient and caregiver  - Arrange for needed discharge resources and transportation as appropriate  - Identify discharge learning needs (meds, wound care, etc )  - Arrange for interpretive services to assist at discharge as needed  - Refer to Case Management Department for coordinating discharge planning if the patient needs post-hospital services based on physician/advanced practitioner order or complex needs related to functional status, cognitive ability, or social support system  Outcome: Progressing     Problem: Knowledge Deficit  Goal: Patient/family/caregiver demonstrates understanding of disease process, treatment plan, medications, and discharge instructions  Description  Complete learning assessment and assess knowledge base  Interventions:  - Provide teaching at level of understanding  - Provide teaching via preferred learning methods  Outcome: Progressing     Problem: NEUROSENSORY - ADULT  Goal: Achieves stable or improved neurological status  Description  INTERVENTIONS  - Monitor and report changes in neurological status  - Monitor vital signs such as temperature, blood pressure, glucose, and any other labs ordered   - Initiate measures to prevent increased intracranial pressure     Outcome: Progressing  Goal: Remains free of injury related to seizures activity  Description       Outcome: Progressing  Goal: Achieves maximal functionality and self care  Description  INTERVENTIONS  - Monitor swallowing and airway patency with patient fatigue and changes in neurological status  - Encourage and assist patient to increase activity and self care     - Encourage visually impaired, hearing impaired and aphasic patients to use assistive/communication devices  Outcome: Progressing     Problem: NEUROSENSORY - ADULT  Goal: Achieves stable or improved neurological status  Description  INTERVENTIONS  - Monitor and report changes in neurological status  - Monitor vital signs such as temperature, blood pressure, glucose, and any other labs ordered   - Initiate measures to prevent increased intracranial pressure     Outcome: Progressing  Goal: Remains free of injury related to seizures activity  Description       Outcome: Progressing  Goal: Achieves maximal functionality and self care  Description  INTERVENTIONS  - Monitor swallowing and airway patency with patient fatigue and changes in neurological status  - Encourage and assist patient to increase activity and self care  - Encourage visually impaired, hearing impaired and aphasic patients to use assistive/communication devices  Outcome: Progressing     Problem: Neurological Deficit  Goal: Neurological status is stable or improving  Description  Interventions:  - Monitor and assess patient's level of consciousness, motor function, sensory function, and level of assistance needed for ADLs  - Monitor and report changes from baseline  Collaborate with interdisciplinary team to initiate plan and implement interventions as ordered  - Provide and maintain a safe environment  - Consider seizure precautions  - Consider fall precautions  - Consider aspiration precautions  - Consider bleeding precautions    Outcome: Progressing

## 2019-08-31 NOTE — PROGRESS NOTES
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  - CTA showed no evidence of acute infarct, intracranial hemorrhage or mass effect   - MRI brain was normal  - Echocardiogram ordered, read pending   - Aspirin 81 mg Qd  - Lipitor 40 mg Qd   - Neurology saw patient and said he was okay to be discharged from a neurological standpoint with outaptient follow up   - Meclizine 25 mg Q8 hr PRN           Disposition: Discharge      SUBJECTIVE     Patient denies chest pain, palpitations, SOB, cough, abdominal pain, nausea, vomiting, constipation, diarrhea, fevers/chills, headaches, dysuria  OBJECTIVE     Vitals:   Temp (24hrs), Av 9 °F (36 6 °C), Min:97 6 °F (36 4 °C), Max:98 °F (36 7 °C)    Temp:  [97 6 °F (36 4 °C)-98 °F (36 7 °C)] 98 °F (36 7 °C)  HR:  [56-67] 64  Resp:  [17-18] 17  BP: (119-137)/(73-93) 119/77  SpO2:  [95 %-98 %] 98 %  Body mass index is 27 06 kg/m²  Input and Output Summary (last 24 hours):        Intake/Output Summary (Last 24 hours) at 2019 1248  Last data filed at 2019 1100  Gross per 24 hour   Intake 480 ml   Output    Net 480 ml       Physical Exam:   GENERAL: Non toxic appearing  HEENT:  Normocephalic, atraumatic, pupils bilaterally reactive to light  CARDIAC:  Regular rate and rhythm, S1, S2 heard, no murmurs  PULMONARY:  CTA B/L, no wheezing/rales/rhonci, non-labored breathing  ABDOMEN:  Soft, NT/ND, +BS, no rebound/guarding/rigidity  Extremities:  2+ Pulses in DP/PT  No edema, cyanosis, or clubbing  NEUROLOGIC:  Alert/oriented x3  SKIN:  No rashes or erythema          ADDITIONAL DATA     Labs & Recent Cultures:     Results from last 7 days   Lab Units 08/31/19  0453   WBC Thousand/uL 7 67   HEMOGLOBIN g/dL 15 5   HEMATOCRIT % 45 6   PLATELETS Thousands/uL 250   NEUTROS PCT % 60   LYMPHS PCT % 28   MONOS PCT % 9   EOS PCT % 2     Results from last 7 days   Lab Units 08/31/19  0453 08/30/19  0558   POTASSIUM mmol/L 3 7 3 8   CHLORIDE mmol/L 109* 110*   CO2 mmol/L 25 26   BUN mg/dL 11 12   CREATININE mg/dL 0 82 0 89   CALCIUM mg/dL 9 2 9 6   ALK PHOS U/L  --  56   ALT U/L  --  38   AST U/L  --  18                     Nutrition:  Diet Regular; Regular House  Radiology Results:   MRI brain wo contrast   Final Result by Tejas Sanchez MD (08/31 1029)      No acute intracranial MR abnormality to account for the patient's symptoms  No diffusion restriction to suggest acute intracranial ischemia  Linear tract of gliosis in the right frontal region and right basal ganglia related to remote prior insult  Trace bilateral mastoid effusions  Workstation performed: [id number]         CTA head and neck with and without contrast   Final Result by Theo Lopez MD (08/29 2126)         1  No evidence of acute infarct, intracranial hemorrhage or mass effect  2   No stenosis, dissection or occlusion of the carotid or vertebral arteries or major vessels of the [place] of [place]                    Workstation performed: [id number]           Scheduled Medications:    aspirin 81 mg Daily   atorvastatin 80 mg Daily With Dinner   enoxaparin 40 mg Daily   multivitamin-minerals 1 tablet Daily       PRN MEDS:    meclizine 25 mg Q8H PRN   tiZANidine 4 mg Daily PRN       Last 24 Hours Medication List:     Current Facility-Administered Medications:  aspirin 81 mg Oral Daily Caryle Brocks, MD   atorvastatin 80 mg Oral Daily With Alcides Lopez MD   enoxaparin 40 mg Subcutaneous Daily Eulalio Mejia MD   meclizine 25 mg Oral Q8H PRN Eulalio Mejia MD   multivitamin-minerals 1 tablet Oral Daily Eulalio Mejia MD   tiZANidine 4 mg Oral Daily PRN Elualio Mejia MD          Time Spent for Care: 30 mins spent in total   More than 50% of total time spent on counseling and coordination of care as described above  Current Length of Stay: 0 day(s)      Code Status: Level 1 - Full Code          ** Please Note: This note is constructed using a voice recognition dictation system   **

## 2019-09-01 LAB — CERULOPLASMIN SERPL-MCNC: 18 MG/DL (ref 16–31)
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
## 2019-09-03 ENCOUNTER — HOSPITAL ENCOUNTER (OUTPATIENT)
Dept: RADIOLOGY | Facility: CLINIC | Age: 49
Discharge: HOME | End: 2019-09-03
Attending: ANESTHESIOLOGY
Payer: COMMERCIAL

## 2019-09-03 VITALS
RESPIRATION RATE: 18 BRPM | DIASTOLIC BLOOD PRESSURE: 83 MMHG | OXYGEN SATURATION: 97 % | SYSTOLIC BLOOD PRESSURE: 131 MMHG | TEMPERATURE: 97.6 F | HEART RATE: 55 BPM

## 2019-09-03 DIAGNOSIS — M51.16 INTERVERTEBRAL DISC DISORDER WITH RADICULOPATHY OF LUMBAR REGION: ICD-10-CM

## 2019-09-03 DIAGNOSIS — M54.16 LUMBAR RADICULOPATHY: ICD-10-CM

## 2019-09-03 PROCEDURE — 64483 NJX AA&/STRD TFRM EPI L/S 1: CPT | Performed by: ANESTHESIOLOGY

## 2019-09-03 RX ORDER — 0.9 % SODIUM CHLORIDE 0.9 %
10 VIAL (ML) INJECTION ONCE
Status: COMPLETED | OUTPATIENT
Start: 2019-09-03 | End: 2019-09-03

## 2019-09-03 RX ORDER — METHYLPREDNISOLONE ACETATE 80 MG/ML
80 INJECTION, SUSPENSION INTRA-ARTICULAR; INTRALESIONAL; INTRAMUSCULAR; PARENTERAL; SOFT TISSUE ONCE
Status: COMPLETED | OUTPATIENT
Start: 2019-09-03 | End: 2019-09-03

## 2019-09-03 RX ORDER — BUPIVACAINE HCL/PF 2.5 MG/ML
10 VIAL (ML) INJECTION ONCE
Status: COMPLETED | OUTPATIENT
Start: 2019-09-03 | End: 2019-09-03

## 2019-09-03 RX ADMIN — BUPIVACAINE HYDROCHLORIDE 2 ML: 2.5 INJECTION, SOLUTION EPIDURAL; INFILTRATION; INTRACAUDAL at 09:58

## 2019-09-03 RX ADMIN — METHYLPREDNISOLONE ACETATE 80 MG: 80 INJECTION, SUSPENSION INTRA-ARTICULAR; INTRALESIONAL; INTRAMUSCULAR; PARENTERAL; SOFT TISSUE at 09:58

## 2019-09-03 RX ADMIN — SODIUM CHLORIDE 5 ML: 9 INJECTION, SOLUTION INTRAMUSCULAR; INTRAVENOUS; SUBCUTANEOUS at 09:56

## 2019-09-03 RX ADMIN — Medication 5 ML: at 09:56

## 2019-09-03 RX ADMIN — IOHEXOL 1 ML: 300 INJECTION, SOLUTION INTRAVENOUS at 09:57

## 2019-09-03 NOTE — NURSING NOTE
Pt felt dizzy when he sat up from procedure  Pt transported to recliner in a w/c, placed in recliner with legs elevated  Dizziness resolved after 3-4 minutes

## 2019-09-03 NOTE — H&P
History of Present Illness: The patient is a 53 y o  male who presents with complaints of lower back and leg pain secondary to lumbar bulging disc and is here today for bilateral L5 transforaminal epidural steroid injection      Patient Active Problem List   Diagnosis    Rash    Contact dermatitis    [facility] ([facility]A) [address] [address]    TBI (traumatic brain injury) ([facility] )    Polyneuropathy, peripheral sensorimotor axonal    Intervertebral disc disorder with radiculopathy of lumbosacral region    Dizziness    Double vision    Hyperlipemia       Past Medical History:   Diagnosis Date    Hyperlipidemia     Migraine     [email or web address] )     possibleTIA       Past Surgical History:   Procedure Laterality Date    FACIAL FRACTURE SURGERY           Current Outpatient Medications:     aspirin 81 mg chewable tablet, Chew 1 tablet (81 mg total) daily for 60 days, Disp: 30 tablet, Rfl: 1    atorvastatin (LIPITOR) 40 mg tablet, Take 40 mg by mouth daily, Disp: , Rfl:     meclizine (ANTIVERT) 25 mg tablet, Take 1 tablet (25 mg total) by mouth every 8 (eight) hours as needed for dizziness, Disp: 30 tablet, Rfl: 0    Multiple Vitamin (MULTIVITAMINS PO), Take by mouth, Disp: , Rfl:     tiZANidine (ZANAFLEX) 4 mg tablet, Take 1 tablet (4 mg total) by mouth daily at bedtime, Disp: 30 tablet, Rfl: 0    Current Facility-Administered Medications:     bupivacaine (PF) (MARCAINE) 0 25 % injection 10 mL, 10 mL, Epidural, Once, Willam Ramos MD    iohexol (OMNIPAQUE) 300 mg/mL injection 50 mL, 50 mL, Epidural, Once, Willam Ramos MD    lidocaine (PF) (XYLOCAINE-MPF) 2 % injection 5 mL, 5 mL, Infiltration, Once, Willam Ramos MD    methylPREDNISolone acetate (DEPO-MEDROL) injection 80 mg, 80 mg, Epidural, Once, Willam Ramos MD    sodium chloride (PF) 0 9 % injection 10 mL, 10 mL, Infiltration, Once, Willam Ramos MD    No Known Allergies    Physical Exam:   Vitals:    09/03/19 0936   BP: 108/74   Pulse: (!) 49 Resp: 20   Temp: 97 6 °F (36 4 °C)   SpO2: 98%     General: Awake, Alert, Oriented x 3, Mood and affect appropriate  Respiratory: Respirations even and unlabored  Cardiovascular: Peripheral pulses intact; no edema  Musculoskeletal Exam:   Lower back tenderness    ASA Score: 2    Patient/Chart Verification  Patient ID Verified: Verbal  Consents Confirmed: To be obtained in the Pre-Procedure area  H&P( within 30 days) Verified: To be obtained in the Pre-Procedure area  Allergies Reviewed: Yes  Anticoag/NSAID held?: NA  Currently on antibiotics?: No    Assessment:   1  Intervertebral disc disorder with radiculopathy of lumbar region    2   Lumbar radiculopathy        Plan: bilateral L5 TFESI

## 2019-09-03 NOTE — DISCHARGE INSTR - LAB
Epidural Steroid Injection   WHAT YOU NEED TO KNOW:   An epidural steroid injection (NICOLLE) is a procedure to inject steroid medicine into the epidural space  The epidural space is between your spinal cord and vertebrae  Steroids reduce inflammation and fluid buildup in your spine that may be causing pain  You may be given pain medicine along with the steroids  ACTIVITY  · Do not drive or operate machinery today  · No strenuous activity today  bending, lifting, etc   · You may resume normal activites starting tomorrow  start slowly and as tolerated  · You may shower today, but no tub baths or hot tubs  · You may have numbness for several hours from the local anesthetic  Please use caution and common sense, especially with weight-bearing activities  CARE OF THE INJECTION SITE  · If you have soreness or pain, apply ice to the area today (20 minutes on/20 minutes off)  · Starting tomorrow, you may use warm, moist heat or ice if needed  · You may have an increase or change in your discomfort for 36-48 hours after your treatment  · Apply ice and continue with any pain medication you have been prescribed  · Notify [facility] if you have any of the following: redness, drainage, swelling, headache, stiff neck or fever above 100°F     SPECIAL INSTRUCTIONS  · Our office will contact you in approximately 7 days for a progress report  MEDICATIONS  · Continue to take all routine medications  · Our office may have instructed you to hold some medications  If you have a problem specifically related to your procedure, please call our office at [phone]  Problems not related to your procedure should be directed to your primary care physician

## 2019-09-10 ENCOUNTER — TELEPHONE (OUTPATIENT)
Dept: PAIN MEDICINE | Facility: CLINIC | Age: 49
End: 2019-09-10

## 2019-09-16 NOTE — TELEPHONE ENCOUNTER
Patient has not so much pain but rather more of an ache      Ache level 5-6  Percentage of relief: 50%

## 2019-11-14 ENCOUNTER — TELEPHONE (OUTPATIENT)
Dept: PAIN MEDICINE | Facility: CLINIC | Age: 49
End: 2019-11-14

## 2019-11-14 NOTE — TELEPHONE ENCOUNTER
S/w spouse, John Neither, ok per SERINA, would like to speak to Dr Rolly Lang to discuss options for her  regarding pain, nothing seemed to have helped       #  [phone]

## 2019-11-15 NOTE — TELEPHONE ENCOUNTER
I s/w pt's wife, Dewayne Casiano (ok per SERINA) she is reporting that her husb says his pain is really bad again and he's not sleeping  He is having to sleep in the recliner  He is fine for 1-2 wks and the pain starts up again  The pain is located where the indents are in his lower back/buttocks area  He also feels tight so they have been doing some stretching and exercises which help a little  He is using ibuprofen daily and only uses the tizanidine for when the pain is at it's worst, prob about 2 times a week  Wife said 2 inj haven't helped and wants to know what other options there are or if a CT is needed? Told wife I would reach out to FQ for rec, he prob needs ov with either FQ or CRNP  Pls advise

## 2019-11-15 NOTE — TELEPHONE ENCOUNTER
I s/w wife Shaina Francie, made her aware that FQ said he had a CT already in May, rec going back to PT and he should take the tizanidine every night  Wife said the PT is out of the question b/c they have to pay 80% out of pocket and that is > $100 for each visit, to expensive for them  He has handout from PT of stretches and exercises and he also found some online to do, I advised that he should do daily  She will have him take the tizanidine nightly, she will call for RF before running out  She's not at home and unsure how many he has left

## 2020-01-20 ENCOUNTER — TELEPHONE (OUTPATIENT)
Dept: PAIN MEDICINE | Facility: MEDICAL CENTER | Age: 50
End: 2020-01-20

## 2020-01-20 NOTE — TELEPHONE ENCOUNTER
I explained that FQ is out of office until 1/24/20  Pt last seen for inj on 9/3/19 and 7/11/19 and initial consult on 6/17/19  I suggest another ov to be re-eval and wife agreed with that plan    Gave ov w/ FQ for 1/29/20 at 7 AM

## 2020-01-29 ENCOUNTER — OFFICE VISIT (OUTPATIENT)
Dept: PAIN MEDICINE | Facility: CLINIC | Age: 50
End: 2020-01-29
Payer: COMMERCIAL

## 2020-01-29 VITALS
BODY MASS INDEX: 34.03 KG/M2 | HEART RATE: 62 BPM | SYSTOLIC BLOOD PRESSURE: 145 MMHG | TEMPERATURE: 98 F | WEIGHT: 244 LBS | DIASTOLIC BLOOD PRESSURE: 91 MMHG

## 2020-01-29 DIAGNOSIS — M43.06 LUMBAR SPONDYLOLYSIS: ICD-10-CM

## 2020-01-29 DIAGNOSIS — G60.8 POLYNEUROPATHY, PERIPHERAL SENSORIMOTOR AXONAL: ICD-10-CM

## 2020-01-29 DIAGNOSIS — M51.17 INTERVERTEBRAL DISC DISORDER WITH RADICULOPATHY OF LUMBOSACRAL REGION: Primary | ICD-10-CM

## 2020-01-29 DIAGNOSIS — M79.18 MYOFASCIAL PAIN SYNDROME: ICD-10-CM

## 2020-01-29 PROCEDURE — 99214 OFFICE O/P EST MOD 30 MIN: CPT | Performed by: ANESTHESIOLOGY

## 2020-01-29 RX ORDER — MELOXICAM 15 MG/1
15 TABLET ORAL DAILY
Qty: 30 TABLET | Refills: 5 | Status: SHIPPED | OUTPATIENT
Start: 2020-01-29

## 2020-01-29 RX ORDER — TIZANIDINE 4 MG/1
4 TABLET ORAL
Qty: 30 TABLET | Refills: 5 | Status: SHIPPED | OUTPATIENT
Start: 2020-01-29

## 2020-01-29 NOTE — PROGRESS NOTES
Assessment:  1  Intervertebral disc disorder with radiculopathy of lumbosacral region    2  Lumbar spondylolysis    3  Polyneuropathy, peripheral sensorimotor axonal    4  Myofascial pain syndrome        Plan:  Unfortunately, the patient continues to experience significant pain in the lower back with left leg weakness that is limiting activities of daily living  At this time, due to the weakness, I will order an MRI of the lumbar spine to evaluate further  I advised them I will call them with the results and discuss treatment moving forward  For now, I will start him on meloxicam 15 mg daily to help with inflammation and pain  He was advised to not take any other NSAIDs while taking the meloxicam   He will also be continued on the tizanidine 4 mg at bedtime which has been helping with the spasms  My impressions and treatment recommendations were discussed in detail with the patient who verbalized understanding and had no further questions  Discharge instructions were provided  I personally saw and examined the patient and I agree with the above discussed plan of care  Orders Placed This Encounter   Procedures    MRI lumbar spine without contrast     Standing Status:   Future     Standing Expiration Date:   1/29/2024     Scheduling Instructions: There is no preparation for this test  Please leave your jewelry and valuables at home, wedding rings are the exception  Please bring your insurance cards, a form of photo ID and a list of your medications with you  Arrive 15 minutes prior to your appointment time in order to register  Please bring any prior CT or MRI studies of this area that were not performed at a [facility]  To schedule this appointment, please contact Central Scheduling at [phone]  Order Specific Question:   What is the patient's sedation requirement?      Answer:   No Sedation     New Medications Ordered This Visit   Medications    tiZANidine (Brandi Rose) 4 mg tablet     Sig: Take 1 tablet (4 mg total) by mouth daily at bedtime     Dispense:  30 tablet     Refill:  5    meloxicam (MOBIC) 15 mg tablet     Sig: Take 1 tablet (15 mg total) by mouth daily     Dispense:  30 tablet     Refill:  5       History of Present Illness:  Kirk Collazo is a 51 y o  male who presents for a follow up office visit in regards to Back Pain (re-evaluation)  The patient has a history of lumbar sacral disc disorder with radiculopathy , polyneuropathy and myofascial pain returns for follow-up  He was last seen over the summer which time he underwent bilateral L5 transforaminal epidural steroid injection twice with minimal lasting relief  He continues with constant pain in the lower back that is sharp with symptoms radiating to both legs but worse on the left  He has been using tizanidine 4 mg at bedtime which is helpful for spasms and allows him to sleep  He did have an EMG over the summer which did show a mild polyneuropathy  I have personally reviewed and/or updated the patient's past medical history, past surgical history, family history, social history, current medications, allergies, and vital signs today  Review of Systems   Respiratory: Negative for shortness of breath  Cardiovascular: Negative for chest pain  Gastrointestinal: Negative for constipation, diarrhea, nausea and vomiting  Musculoskeletal: Negative for arthralgias, gait problem, joint swelling and myalgias  Skin: Negative for rash  Neurological: Negative for dizziness, seizures and weakness  All other systems reviewed and are negative        Patient Active Problem List   Diagnosis    Rash    Contact dermatitis    [facility]    TBI [facility]traumatic brain injury) ([facility] )    Polyneuropathy, peripheral sensorimotor axonal    Intervertebral disc disorder with radiculopathy of lumbosacral region    Dizziness    Double vision    Hyperlipemia    Lumbar radiculopathy Past Medical History:   Diagnosis Date    Hyperlipidemia     Migraine     [facility] [facility] [address] )     possibleTIA       Past Surgical History:   Procedure Laterality Date    FACIAL FRACTURE SURGERY         Family History   Problem Relation Age of Onset    Diabetes Mother        Social History     Occupational History    Not on file   Tobacco Use    Smoking status: Former Smoker    Smokeless tobacco: Never Used   Substance and Sexual Activity    Alcohol use: Not Currently    Drug use: No    Sexual activity: Not on file       Current Outpatient Medications on File Prior to Visit   Medication Sig    meclizine (ANTIVERT) 25 mg tablet Take 1 tablet (25 mg total) by mouth every 8 (eight) hours as needed for dizziness    Multiple Vitamin (MULTIVITAMINS PO) Take by mouth    [DISCONTINUED] tiZANidine (ZANAFLEX) 4 mg tablet Take 1 tablet (4 mg total) by mouth daily at bedtime    aspirin 81 mg chewable tablet Chew 1 tablet (81 mg total) daily for 60 days    [DISCONTINUED] atorvastatin (LIPITOR) 40 mg tablet Take 40 mg by mouth daily     No current facility-administered medications on file prior to visit  No Known Allergies    Physical Exam:    /91   Pulse 62   Temp 98 °F (36 7 °C) (Oral)   Wt 111 kg (244 lb)   BMI 34 03 kg/m²     Constitutional:normal, well developed, well nourished, alert, in no distress and non-toxic and no overt pain behavior  and obese  Eyes:anicteric  HEENT:grossly intact  Neck:supple, symmetric, trachea midline and no masses   Pulmonary:even and unlabored  Cardiovascular:No edema or pitting edema present  Skin:Normal without rashes or lesions and well hydrated  Psychiatric:Mood and affect appropriate  Neurologic:Cranial Nerves II-XII grossly intact  Musculoskeletal:normal     Lumbar Spine Exam  Appearance:  Normal lordosis  Palpation/Tenderness:  left lumbar paraspinal tenderness  right lumbar paraspinal tenderness  Range of Motion:  Flexion:   Moderately limited  with pain  Extension:  Moderately limited  with pain  Lateral Flexion - Left:  Moderately limited  with pain  Lateral Flexion - Right:  Moderately limited  with pain  Rotation - Left:  Moderately limited  with pain  Rotation - Right:  Moderately limited  with pain  Motor Strength:  Left hip flexion:  5/5  Left hip extension:  5/5  Right hip flexion:  5/5  Right hip extension:  5/5  Left knee flexion:  5/5  Left knee extension:  4/5  Right knee flexion:  5/5  Right knee extension:  5/5  Left foot dorsiflexion:  3/5  Left foot plantar flexion:  5/5  Right foot dorsiflexion:  5/5  Right foot plantar flexion:  5/5  Reflexes:  Left Patellar:  2+   Right Patellar:  2+   Left Achilles:  2+   Right Achilles:  2+   Special Tests:  Left Straight Leg Test:  positive  Right Straight Leg Test:  negative  Left Enrique's Maneuver:  negative  Right Enrique's Maneuver:  negative    Imaging    CT LUMBAR SPINE (5/7/2019)     INDICATION: Right foot weakness, low back pain and lower extremity hyperreflexia      COMPARISON: None      TECHNIQUE:  Contiguous axial images through the lumbar spine were obtained  Sagittal and coronal reconstructions were performed        Radiation dose length product (DLP) for this visit:  475 mGy-cm   This examination, like all CT scans performed in the [facility], was performed utilizing techniques to minimize radiation dose exposure, including the use of iterative   reconstruction and automated exposure control        IMAGE QUALITY:  Diagnostic      FINDINGS:     ALIGNMENT:  Maintained lumbar lordosis with grade 1 anterolisthesis of L5 on S1 and grade 1 retrolisthesis of L1 on L2  There is a mild levoscoliotic curvature to the lumbar spine with the apex at the L2-3 level  There is no lateral subluxation  The   vertebral bodies are maintained in stature      VERTEBRAL BODIES: Spina bifida occulta at S1 and chronic bilateral L5 spondylolysis  No acute fracture    No lytic or blastic lesion      DEGENERATIVE CHANGES:     Lower Thoracic spine:  Normal lower thoracic disc spaces  ]     L1-2:  Disc space narrowing, retrolisthesis, mild disc bulge and mild bilateral inferior foraminal encroachment      L2-3:  Mild circumferential disc bulge with mild bilateral inferior foraminal stenosis and mild spinal stenosis      L3-4:  Circumferential disc bulge with mild spinal stenosis  Right foraminal protrusion with mild right foraminal stenosis, correlate for L3 radiculopathy      L4-5:  Mild disc bulge with a superimposed left central protrusion with mild ventral indentation on the thecal sac  Patent spinal canal with mild bilateral foraminal stenosis      L5-S1:  Bilateral L5 spondylolysis and grade 1 spondylolisthesis with uncovering the posterior disc margin  Broad posterior protrusion, slightly eccentric to the left encroaching on the left neural foramina where there is mild foraminal stenosis,   correlate for ipsilateral L5 radiculopathy      PARASPINAL SOFT TISSUES:  Incidentally noted sigmoid diverticulosis without diverticulitis  No prevertebral or paravertebral soft tissue edema    Infrarenal IVC filter in place

## 2020-02-04 ENCOUNTER — HOSPITAL ENCOUNTER (OUTPATIENT)
Dept: MRI IMAGING | Facility: HOSPITAL | Age: 50
Discharge: HOME | End: 2020-02-04
Payer: COMMERCIAL

## 2020-02-04 DIAGNOSIS — M51.17 INTERVERTEBRAL DISC DISORDER WITH RADICULOPATHY OF LUMBOSACRAL REGION: ICD-10-CM

## 2020-02-04 PROCEDURE — 72148 MRI LUMBAR SPINE W/O DYE: CPT

## 2020-02-10 ENCOUNTER — TELEPHONE (OUTPATIENT)
Dept: PAIN MEDICINE | Facility: CLINIC | Age: 50
End: 2020-02-10

## 2020-02-10 NOTE — TELEPHONE ENCOUNTER
Discussed MRI L-spine with patient's wife which shows small disc protrusion at L3-4 on the right which would not account for left sided weakness as well chronic spondylolysis at L5-S1      Patient will cont with exercise on his own and f/u as needed

## 2020-06-29 ENCOUNTER — TELEPHONE (OUTPATIENT)
Dept: PAIN MEDICINE | Facility: CLINIC | Age: 50
End: 2020-06-29

## 2020-07-06 ENCOUNTER — TELEPHONE (OUTPATIENT)
Dept: PAIN MEDICINE | Facility: CLINIC | Age: 50
End: 2020-07-06

## 2020-07-06 NOTE — TELEPHONE ENCOUNTER
Received signed release & faxed records to  [facility] RUSTY MARCIAL

## 2020-08-27 ENCOUNTER — TELEPHONE (OUTPATIENT)
Dept: PAIN MEDICINE | Facility: CLINIC | Age: 50
End: 2020-08-27

## 2020-08-27 ENCOUNTER — TRANSCRIBE ORDERS (OUTPATIENT)
Dept: NEUROSURGERY | Facility: CLINIC | Age: 50
End: 2020-08-27

## 2020-08-27 DIAGNOSIS — M54.50 LOWER BACK PAIN: Primary | ICD-10-CM

## 2020-10-01 ENCOUNTER — HOSPITAL ENCOUNTER (EMERGENCY)
Facility: HOSPITAL | Age: 50
Discharge: HOME | End: 2020-10-01
Attending: EMERGENCY MEDICINE | Admitting: EMERGENCY MEDICINE
Payer: COMMERCIAL

## 2020-10-01 ENCOUNTER — APPOINTMENT (EMERGENCY)
Dept: RADIOLOGY | Facility: HOSPITAL | Age: 50
End: 2020-10-01
Payer: COMMERCIAL

## 2020-10-01 VITALS
HEART RATE: 67 BPM | RESPIRATION RATE: 16 BRPM | TEMPERATURE: 97.9 F | DIASTOLIC BLOOD PRESSURE: 86 MMHG | OXYGEN SATURATION: 97 % | SYSTOLIC BLOOD PRESSURE: 142 MMHG

## 2020-10-01 DIAGNOSIS — M54.50 ACUTE EXACERBATION OF CHRONIC LOW BACK PAIN: Primary | ICD-10-CM

## 2020-10-01 DIAGNOSIS — G89.29 ACUTE EXACERBATION OF CHRONIC LOW BACK PAIN: Primary | ICD-10-CM

## 2020-10-01 PROCEDURE — 96372 THER/PROPH/DIAG INJ SC/IM: CPT

## 2020-10-01 PROCEDURE — 72100 X-RAY EXAM L-S SPINE 2/3 VWS: CPT

## 2020-10-01 PROCEDURE — 99284 EMERGENCY DEPT VISIT MOD MDM: CPT | Performed by: PHYSICIAN ASSISTANT

## 2020-10-01 PROCEDURE — 99283 EMERGENCY DEPT VISIT LOW MDM: CPT

## 2020-10-01 RX ORDER — KETOROLAC TROMETHAMINE 30 MG/ML
15 INJECTION, SOLUTION INTRAMUSCULAR; INTRAVENOUS ONCE
Status: COMPLETED | OUTPATIENT
Start: 2020-10-01 | End: 2020-10-01

## 2020-10-01 RX ORDER — METHOCARBAMOL 500 MG/1
500 TABLET, FILM COATED ORAL ONCE
Status: COMPLETED | OUTPATIENT
Start: 2020-10-01 | End: 2020-10-01

## 2020-10-01 RX ORDER — LIDOCAINE 50 MG/G
1 PATCH TOPICAL DAILY
Qty: 30 PATCH | Refills: 0 | Status: SHIPPED | OUTPATIENT
Start: 2020-10-01

## 2020-10-01 RX ORDER — LIDOCAINE 50 MG/G
2 PATCH TOPICAL ONCE
Status: DISCONTINUED | OUTPATIENT
Start: 2020-10-01 | End: 2020-10-01 | Stop reason: HOSPADM

## 2020-10-01 RX ADMIN — METHOCARBAMOL TABLETS 500 MG: 500 TABLET, COATED ORAL at 11:31

## 2020-10-01 RX ADMIN — LIDOCAINE 5% 2 PATCH: 700 PATCH TOPICAL at 11:29

## 2020-10-01 RX ADMIN — KETOROLAC TROMETHAMINE 15 MG: 30 INJECTION, SOLUTION INTRAMUSCULAR at 11:31

## 2020-10-02 ENCOUNTER — TELEPHONE (OUTPATIENT)
Dept: PHYSICAL THERAPY | Facility: OTHER | Age: 50
End: 2020-10-02

## 2020-10-07 ENCOUNTER — TELEPHONE (OUTPATIENT)
Dept: PHYSICAL THERAPY | Facility: OTHER | Age: 50
End: 2020-10-07

## 2021-07-27 ENCOUNTER — OFFICE VISIT (OUTPATIENT)
Dept: UROLOGY | Facility: AMBULATORY SURGERY CENTER | Age: 51
End: 2021-07-27
Payer: COMMERCIAL

## 2021-07-27 VITALS
WEIGHT: 183 LBS | SYSTOLIC BLOOD PRESSURE: 138 MMHG | BODY MASS INDEX: 25.52 KG/M2 | HEART RATE: 72 BPM | DIASTOLIC BLOOD PRESSURE: 76 MMHG

## 2021-07-27 DIAGNOSIS — Z12.5 PROSTATE CANCER SCREENING: ICD-10-CM

## 2021-07-27 DIAGNOSIS — R31.0 GROSS HEMATURIA: Primary | ICD-10-CM

## 2021-07-27 LAB
BACTERIA UR QL AUTO: NORMAL /HPF
BILIRUB UR QL STRIP: NEGATIVE
CLARITY UR: CLEAR
COLOR UR: YELLOW
GLUCOSE UR STRIP-MCNC: NEGATIVE MG/DL
HGB UR QL STRIP.AUTO: NEGATIVE
HYALINE CASTS #/AREA URNS LPF: NORMAL /LPF
KETONES UR STRIP-MCNC: NEGATIVE MG/DL
LEUKOCYTE ESTERASE UR QL STRIP: NEGATIVE
NITRITE UR QL STRIP: NEGATIVE
NON-SQ EPI CELLS URNS QL MICRO: NORMAL /HPF
PH UR STRIP.AUTO: 6 [PH]
PROT UR STRIP-MCNC: NEGATIVE MG/DL
RBC #/AREA URNS AUTO: NORMAL /HPF
SL AMB  POCT GLUCOSE, UA: NORMAL
SL AMB LEUKOCYTE ESTERASE,UA: NORMAL
SL AMB POCT BILIRUBIN,UA: NORMAL
SL AMB POCT BLOOD,UA: NORMAL
SL AMB POCT CLARITY,UA: CLEAR
SL AMB POCT COLOR,UA: YELLOW
SL AMB POCT KETONES,UA: NORMAL
SL AMB POCT NITRITE,UA: NORMAL
SL AMB POCT PH,UA: 6
SL AMB POCT SPECIFIC GRAVITY,UA: 1.01
SL AMB POCT URINE PROTEIN: NORMAL
SL AMB POCT UROBILINOGEN: 0.2
SP GR UR STRIP.AUTO: 1.02 (ref 1–1.03)
UROBILINOGEN UR QL STRIP.AUTO: 1 E.U./DL
WBC #/AREA URNS AUTO: NORMAL /HPF

## 2021-07-27 PROCEDURE — 81001 URINALYSIS AUTO W/SCOPE: CPT | Performed by: NURSE PRACTITIONER

## 2021-07-27 PROCEDURE — 87086 URINE CULTURE/COLONY COUNT: CPT | Performed by: NURSE PRACTITIONER

## 2021-07-27 PROCEDURE — 99203 OFFICE O/P NEW LOW 30 MIN: CPT | Performed by: NURSE PRACTITIONER

## 2021-07-27 PROCEDURE — 88112 CYTOPATH CELL ENHANCE TECH: CPT | Performed by: PATHOLOGY

## 2021-07-27 PROCEDURE — 81002 URINALYSIS NONAUTO W/O SCOPE: CPT | Performed by: NURSE PRACTITIONER

## 2021-07-27 RX ORDER — NAPROXEN 500 MG/1
500 TABLET ORAL EVERY 12 HOURS PRN
COMMUNITY
Start: 2021-07-05 | End: 2022-07-05

## 2021-07-27 NOTE — PROGRESS NOTES
7/27/2021      Chief Complaint   Patient presents with    New Patient Visit    Gross Hematuria     Assessment and Plan    53 y o  male managed by NEW PATIENT    1  Gross Hematuria  ·  urine dip in office unremarkable, no blood noted  Color clear yellow  ·  CT renal study ordered, BMP to be performed prior to CT study  ·  cystoscopy ordered  ·  urinalysis with microscopy, urine culture, urine for cytology sent from the office today    2  Prostate Cancer Screening  · PSA to be scheduled in 2 weeks  · MARGARET- prostate 35 grams smooth non-tender  No nodules noted   ·  if PSA stable resume prostate cancer screening at the age of 55    History of Present Illness  Dede Kirkland is a 53 y o  male here for follow up evaluation of gross hematuria  Patient reports 1 episode of gross hematuria earlier this week  He denies all other lower urinary tract symptoms  He reports sensation of complete bladder emptying with urination  He does complain of low back pain  He denies a history of nephrolithiasis  He denies a family history of prostate cancer, urothelial carcinomas  Patient reports a previous history of marijuana use  He denies smoking and the use / abuse of illicit substances and alcohol  Review of Systems   Constitutional: Negative for chills and fever  Respiratory: Negative for cough and shortness of breath  Cardiovascular: Negative for chest pain  Gastrointestinal: Negative for abdominal distention, abdominal pain, blood in stool, nausea and vomiting  Genitourinary: Positive for hematuria  Negative for difficulty urinating, dysuria, enuresis, flank pain, frequency and urgency  Musculoskeletal: Positive for back pain (lumbar )  Skin: Negative for rash  Neurological: Negative for dizziness  AUA SYMPTOM SCORE      Most Recent Value   AUA SYMPTOM SCORE   How often have you had a sensation of not emptying your bladder completely after you finished urinating?   2   How often have you had to urinate again less than two hours after you finished urinating? 2   How often have you found you stopped and started again several times when you urinate?  0   How often have you found it difficult to postpone urination? 0   How often have you had a weak urinary stream?  0   How often have you had to push or strain to begin urination?   0   How many times did you most typically get up to urinate from the time you went to bed at night until the time you got up in the morning?  0   Quality of Life: If you were to spend the rest of your life with your urinary condition just the way it is now, how would you feel about that?  2   AUA SYMPTOM SCORE  4         Past Medical History  Past Medical History:   Diagnosis Date    Hyperlipidemia     Migraine     Stroke ([facility] [address] )     possibleTIA       Past Social History  Past Surgical History:   Procedure Laterality Date    FACIAL FRACTURE SURGERY       Social History     Tobacco Use   Smoking Status Former Smoker   Smokeless Tobacco Never Used       Past Family History  Family History   Problem Relation Age of Onset    Diabetes Mother        Past Social history  Social History     Socioeconomic History    Marital status: Single     Spouse name: Not on file    Number of children: Not on file    Years of education: Not on file    Highest education level: Not on file   Occupational History    Not on file   Tobacco Use    Smoking status: Former Smoker    Smokeless tobacco: Never Used   Vaping Use    Vaping Use: Never used   Substance and Sexual Activity    Alcohol use: Not Currently    Drug use: No    Sexual activity: Not on file   Other Topics Concern    Not on file   Social History Narrative    Not on file     Social Determinants of Health     Financial Resource Strain:     Difficulty of Paying Living Expenses:    Food Insecurity:     Worried About Running Out of Food in the Last Year:     [address] in the Last Year:    Transportation Needs:     Lack of Transportation (Medical):  Lack of Transportation (Non-Medical):    Physical Activity:     Days of Exercise per Week:     Minutes of Exercise per Session:    Stress:     Feeling of Stress :    Social Connections:     Frequency of Communication with Friends and Family:     Frequency of Social Gatherings with Friends and Family:     Attends Yarsanism Services:     Active Member of Clubs or Organizations:     Attends Club or Organization Meetings:     Marital Status:    Intimate Partner Violence:     Fear of Current or Ex-Partner:     Emotionally Abused:     Physically Abused:     Sexually Abused:        Current Medications  Current Outpatient Medications   Medication Sig Dispense Refill    aspirin 81 mg chewable tablet Chew 1 tablet (81 mg total) daily for 60 days 30 tablet 1    lidocaine (LIDODERM) 5 % Apply 1 patch topically daily Remove & Discard patch within 12 hours or as directed by MD 30 patch 0    meclizine (ANTIVERT) 25 mg tablet Take 1 tablet (25 mg total) by mouth every 8 (eight) hours as needed for dizziness 30 tablet 0    meloxicam (MOBIC) 15 mg tablet Take 1 tablet (15 mg total) by mouth daily 30 tablet 5    Multiple Vitamin (MULTIVITAMINS PO) Take by mouth      naproxen (NAPROSYN) 500 mg tablet Take 500 mg by mouth every 12 (twelve) hours as needed      tiZANidine (ZANAFLEX) 4 mg tablet Take 1 tablet (4 mg total) by mouth daily at bedtime 30 tablet 5     No current facility-administered medications for this visit  Allergies  No Known Allergies      The following portions of the patient's history were reviewed and updated as appropriate: allergies, current medications, past medical history, past social history, past surgical history and problem list       Vitals  Vitals:    07/27/21 1430   BP: 138/76   BP Location: Left arm   Patient Position: Sitting   Cuff Size: Adult   Pulse: 72   Weight: 83 kg (183 lb)     Physical Exam  Physical Exam  Vitals reviewed     Constitutional: General: He is not in acute distress  Appearance: Normal appearance  He is normal weight  HENT:      Head: Normocephalic  Eyes:      Pupils: Pupils are equal, round, and reactive to light  Cardiovascular:      Rate and Rhythm: Normal rate  Pulmonary:      Effort: No respiratory distress  Breath sounds: Normal breath sounds  Genitourinary:     Penis: Normal        Testes: Normal       Comments:   MARGARET -prostate 35 g smooth nontender  No nodules noted  Skin:     General: Skin is warm and dry  Neurological:      General: No focal deficit present  Mental Status: He is alert and oriented to person, place, and time  Psychiatric:         Mood and Affect: Mood normal          Behavior: Behavior normal        Results  No results found for this or any previous visit (from the past 1 hour(s))  ]  No results found for: PSA  Lab Results   Component Value Date    CALCIUM 9 2 08/31/2019    K 3 7 08/31/2019    CO2 25 08/31/2019     (H) 08/31/2019    BUN 11 08/31/2019    CREATININE 0 82 08/31/2019     Lab Results   Component Value Date    WBC 7 67 08/31/2019    HGB 15 5 08/31/2019    HCT 45 6 08/31/2019    MCV 92 08/31/2019     08/31/2019     Orders  Orders Placed This Encounter   Procedures    Cystoscopy     Standing Status:   Future     Standing Expiration Date:   1/27/2022    Urine culture     Order Specific Question:   Release to patient through [employer]     Answer:   Immediate    CT renal protocol     Standing Status:   Future     Standing Expiration Date:   7/27/2025     Scheduling Instructions:      Nothing to eat 3 hours prior to your test   Clear liquids are also permitted up until the time of the scan  Clear liquids include water, black coffee or tea, apple juice or clear broth  If possible wear clothing without any metal in the abdomen area  Sweat suit, shorts, sports bra or bra without underwire may eliminate the need to change   Please bring your insurance cards, a form of photo ID and a list of your medications with you  Arrive 15 minutes prior to your appointment time in order to register  On the day of your test, please bring any prior CT or MRI studies of this area with you that were not performed at a [facility]  To schedule this appointment, please contact Central Scheduling at [phone]  Order Specific Question:   What is the patient's sedation requirement? Answer:   No Sedation     Order Specific Question:   Contrast information:     Answer:   IV     Order Specific Question:   Did the patient ever have a reaction to x-ray dye? If yes, please verify the type of allergy and order the contrast allergy prep  Answer:   No     Order Specific Question:   Release to patient through [employer]     Answer:   Immediate     Order Specific Question:   Reason for Exam (FREE TEXT)     Answer:   gross hematuria    Urinalysis with microscopic    Basic metabolic panel     This is a patient instruction: Patient fasting for 8 hours or longer recommended  Standing Status:   Future     Standing Expiration Date:   7/27/2022    PSA, Total Screen     This is a patient instruction: This test is non-fasting  Please drink two glasses of water morning of bloodwork          Standing Status:   Future     Standing Expiration Date:   1/27/2023    POCT urine dip       KEVEN Rockwell

## 2021-07-27 NOTE — PATIENT INSTRUCTIONS
Urine testing sent from the office today  Schedule CT scan    Blood work to be done prior to CT scan  Schedule cystoscopy  PSA to be performed in 2 weeks

## 2021-07-29 LAB — BACTERIA UR CULT: NORMAL

## 2021-08-02 ENCOUNTER — APPOINTMENT (OUTPATIENT)
Dept: LAB | Facility: HOSPITAL | Age: 51
End: 2021-08-02
Payer: COMMERCIAL

## 2021-08-02 DIAGNOSIS — R31.0 GROSS HEMATURIA: ICD-10-CM

## 2021-08-02 DIAGNOSIS — Z12.5 PROSTATE CANCER SCREENING: ICD-10-CM

## 2021-08-02 LAB
ANION GAP SERPL CALCULATED.3IONS-SCNC: 9 MMOL/L (ref 4–13)
BUN SERPL-MCNC: 11 MG/DL (ref 5–25)
CALCIUM SERPL-MCNC: 8.7 MG/DL (ref 8.3–10.1)
CHLORIDE SERPL-SCNC: 106 MMOL/L (ref 100–108)
CO2 SERPL-SCNC: 24 MMOL/L (ref 21–32)
CREAT SERPL-MCNC: 0.78 MG/DL (ref 0.6–1.3)
GFR SERPL CREATININE-BSD FRML MDRD: 106 ML/MIN/1.73SQ M
GLUCOSE P FAST SERPL-MCNC: 105 MG/DL (ref 65–99)
POTASSIUM SERPL-SCNC: 3.7 MMOL/L (ref 3.5–5.3)
PSA SERPL-MCNC: 0.5 NG/ML (ref 0–4)
SODIUM SERPL-SCNC: 139 MMOL/L (ref 136–145)

## 2021-08-02 PROCEDURE — G0103 PSA SCREENING: HCPCS

## 2021-08-02 PROCEDURE — 80048 BASIC METABOLIC PNL TOTAL CA: CPT

## 2021-08-02 PROCEDURE — 36415 COLL VENOUS BLD VENIPUNCTURE: CPT

## 2021-08-03 ENCOUNTER — HOSPITAL ENCOUNTER (OUTPATIENT)
Dept: RADIOLOGY | Age: 51
Discharge: HOME | End: 2021-08-03
Payer: COMMERCIAL

## 2021-08-03 DIAGNOSIS — R31.0 GROSS HEMATURIA: ICD-10-CM

## 2021-08-03 PROCEDURE — G1004 CDSM NDSC: HCPCS

## 2021-08-03 PROCEDURE — 74178 CT ABD&PLV WO CNTR FLWD CNTR: CPT

## 2021-08-03 RX ADMIN — IOHEXOL 120 ML: 350 INJECTION, SOLUTION INTRAVENOUS at 09:22

## 2021-08-27 ENCOUNTER — PROCEDURE VISIT (OUTPATIENT)
Dept: UROLOGY | Facility: AMBULATORY SURGERY CENTER | Age: 51
End: 2021-08-27
Payer: COMMERCIAL

## 2021-08-27 VITALS
BODY MASS INDEX: 25.62 KG/M2 | HEART RATE: 80 BPM | WEIGHT: 183 LBS | SYSTOLIC BLOOD PRESSURE: 130 MMHG | HEIGHT: 71 IN | DIASTOLIC BLOOD PRESSURE: 76 MMHG

## 2021-08-27 DIAGNOSIS — R31.0 GROSS HEMATURIA: Primary | ICD-10-CM

## 2021-08-27 LAB
SL AMB  POCT GLUCOSE, UA: NORMAL
SL AMB LEUKOCYTE ESTERASE,UA: NORMAL
SL AMB POCT BILIRUBIN,UA: NORMAL
SL AMB POCT BLOOD,UA: NORMAL
SL AMB POCT CLARITY,UA: CLEAR
SL AMB POCT COLOR,UA: YELLOW
SL AMB POCT KETONES,UA: NORMAL
SL AMB POCT NITRITE,UA: NORMAL
SL AMB POCT PH,UA: 5
SL AMB POCT SPECIFIC GRAVITY,UA: 1.02
SL AMB POCT URINE PROTEIN: NORMAL
SL AMB POCT UROBILINOGEN: 0.2

## 2021-08-27 PROCEDURE — 81002 URINALYSIS NONAUTO W/O SCOPE: CPT | Performed by: UROLOGY

## 2021-08-27 PROCEDURE — 52000 CYSTOURETHROSCOPY: CPT | Performed by: UROLOGY

## 2021-08-27 NOTE — PROGRESS NOTES
Patient seen in follow-up with history of gross hematuria  He reports hematuria on 1 occasion  This resolved spontaneously  Denies family history of prostate problems or prostate cancer  He does take aspirin daily  He has Parkinson's disease  Denies urinary symptoms  Not a smoker  PSA 0 5  CT scan renal protocol does not reveal any abnormality other than small indentation of prostate into the bladder  Cystoscopy     Date/Time 8/27/2021 3:05 PM     Performed by  Kely Donnelly MD     Authorized by Kely Donnelly MD          Procedure Details:  Procedure type: cystoscopy    Additional Procedure Details:   Patient was prepped with Betadine  2% lidocaine jelly was instilled per urethra  The 16 [place] flexible cystoscope was placed  There is no urethral abnormality noted  Bladder outlet is patent without any prostatic obstruction  Evaluation of bladder revealed normal bilateral ureteral orifices  No abnormal mass or suspicious lesion noted  Retroflexion does reveal small area of indentation to the bladder however not clinically significant  Plan  Explained to the patient's wife who helped translate, that there is no cause for concern at this time  Likely he had some prostate bleeding due to strenuous activity or straining for bowel movement  There is no evidence of pathology at this time  Recommend follow-up in about 5 years for prostate cancer screening  Should notify us if symptoms recur

## 2021-08-27 NOTE — LETTER
August 27, 2021     Roddy Duvall MD  [address] [address]    Patient: Clarice Degroot   YOB: 1970   Date of Visit: 8/27/2021       Dear Dr Ronit Loera: Thank you for referring Clarice Degroot to me for evaluation  Below are my notes for this consultation  If you have questions, please do not hesitate to call me  I look forward to following your patient along with you  Sincerely,        Bridget Hopkins MD        CC: No Recipients  Bridget Hopkins MD  8/27/2021  3:06 PM  Sign when Signing Visit    Patient seen in follow-up with history of gross hematuria  He reports hematuria on 1 occasion  This resolved spontaneously  Denies family history of prostate problems or prostate cancer  He does take aspirin daily  He has Parkinson's disease  Denies urinary symptoms  Not a smoker  PSA 0 5  CT scan renal protocol does not reveal any abnormality other than small indentation of prostate into the bladder  Cystoscopy     Date/Time 8/27/2021 3:05 PM     Performed by  Bridget Hopkins MD     Authorized by Bridget Hopkins MD          Procedure Details:  Procedure type: cystoscopy    Additional Procedure Details:   Patient was prepped with Betadine  2% lidocaine jelly was instilled per urethra  The 16 [place] flexible cystoscope was placed  There is no urethral abnormality noted  Bladder outlet is patent without any prostatic obstruction  Evaluation of bladder revealed normal bilateral ureteral orifices  No abnormal mass or suspicious lesion noted  Retroflexion does reveal small area of indentation to the bladder however not clinically significant  Plan  Explained to the patient's wife who helped translate, that there is no cause for concern at this time  Likely he had some prostate bleeding due to strenuous activity or straining for bowel movement  There is no evidence of pathology at this time    Recommend follow-up in about 5 years for prostate cancer screening  Should notify us if symptoms recur

## 2022-07-18 ENCOUNTER — OFFICE VISIT (OUTPATIENT)
Dept: INTERNAL MEDICINE CLINIC | Facility: CLINIC | Age: 52
End: 2022-07-18
Payer: COMMERCIAL

## 2022-07-18 VITALS
TEMPERATURE: 97.9 F | BODY MASS INDEX: 25.34 KG/M2 | DIASTOLIC BLOOD PRESSURE: 80 MMHG | HEIGHT: 71 IN | HEART RATE: 64 BPM | SYSTOLIC BLOOD PRESSURE: 130 MMHG | OXYGEN SATURATION: 97 % | WEIGHT: 181 LBS

## 2022-07-18 DIAGNOSIS — Z11.4 SCREENING FOR HIV (HUMAN IMMUNODEFICIENCY VIRUS): ICD-10-CM

## 2022-07-18 DIAGNOSIS — R31.9 HEMATURIA, UNSPECIFIED TYPE: Primary | ICD-10-CM

## 2022-07-18 DIAGNOSIS — Z11.59 NEED FOR HEPATITIS C SCREENING TEST: ICD-10-CM

## 2022-07-18 DIAGNOSIS — G20 PARKINSON'S DISEASE (TREMOR, STIFFNESS, SLOW MOTION, UNSTABLE POSTURE) (HCC): ICD-10-CM

## 2022-07-18 DIAGNOSIS — Z12.9 SCREENING FOR CANCER: ICD-10-CM

## 2022-07-18 LAB
BACTERIA UR QL AUTO: ABNORMAL /HPF
BILIRUB UR QL STRIP: NEGATIVE
CLARITY UR: CLEAR
COLOR UR: YELLOW
GLUCOSE UR STRIP-MCNC: NEGATIVE MG/DL
HGB UR QL STRIP.AUTO: NEGATIVE
KETONES UR STRIP-MCNC: NEGATIVE MG/DL
LEUKOCYTE ESTERASE UR QL STRIP: NEGATIVE
MUCOUS THREADS UR QL AUTO: ABNORMAL
NITRITE UR QL STRIP: NEGATIVE
NON-SQ EPI CELLS URNS QL MICRO: ABNORMAL /HPF
PH UR STRIP.AUTO: 6 [PH]
PROT UR STRIP-MCNC: ABNORMAL MG/DL
RBC #/AREA URNS AUTO: ABNORMAL /HPF
SL AMB  POCT GLUCOSE, UA: NEGATIVE
SL AMB LEUKOCYTE ESTERASE,UA: NORMAL
SL AMB POCT BILIRUBIN,UA: NEGATIVE
SL AMB POCT BLOOD,UA: NEGATIVE
SL AMB POCT CLARITY,UA: NORMAL
SL AMB POCT COLOR,UA: NORMAL
SL AMB POCT KETONES,UA: NEGATIVE
SL AMB POCT NITRITE,UA: NEGATIVE
SL AMB POCT PH,UA: 6
SL AMB POCT SPECIFIC GRAVITY,UA: 1.03
SL AMB POCT URINE PROTEIN: NORMAL
SL AMB POCT UROBILINOGEN: NORMAL
SP GR UR STRIP.AUTO: 1.02 (ref 1–1.03)
UROBILINOGEN UR STRIP-ACNC: <2 MG/DL
WBC #/AREA URNS AUTO: ABNORMAL /HPF

## 2022-07-18 PROCEDURE — 81001 URINALYSIS AUTO W/SCOPE: CPT | Performed by: INTERNAL MEDICINE

## 2022-07-18 PROCEDURE — 81002 URINALYSIS NONAUTO W/O SCOPE: CPT | Performed by: INTERNAL MEDICINE

## 2022-07-18 PROCEDURE — 99213 OFFICE O/P EST LOW 20 MIN: CPT | Performed by: INTERNAL MEDICINE

## 2022-07-18 RX ORDER — GLUCOSAMINE HCL 500 MG
1 TABLET ORAL DAILY
COMMUNITY

## 2022-07-18 RX ORDER — ASPIRIN 81 MG/1
81 TABLET ORAL DAILY
COMMUNITY

## 2022-07-18 RX ORDER — CARBIDOPA 25 MG/1
25 TABLET ORAL 3 TIMES DAILY PRN
COMMUNITY
Start: 2021-11-19 | End: 2022-11-19

## 2022-07-18 NOTE — PROGRESS NOTES
Assessment/Plan:    Diagnoses and all orders for this visit:    Hematuria, unspecified type  Comments:  POCT urine negative for blood  Orders:  -     Cancel: UA w Reflex to Microscopic w Reflex to Culture  -     PSA, total and free; Future  -     CBC and differential; Future  -     Comprehensive metabolic panel; Future  -     Lipid panel; Future  -     TSH, 3rd generation with Free T4 reflex; Future  -     POCT urine dip  -     Cancel: Urine culture; Future  -     UA w Reflex to Microscopic w Reflex to Culture  -     Urine Microscopic    Screening for cancer  -     Ambulatory referral for colonoscopy; Future  -     PSA, total and free; Future  -     CBC and differential; Future  -     Comprehensive metabolic panel; Future  -     Lipid panel; Future  -     TSH, 3rd generation with Free T4 reflex; Future    Parkinson's disease (tremor, stiffness, slow motion, unstable posture) (HCC)  -     PSA, total and free; Future  -     CBC and differential; Future  -     Comprehensive metabolic panel; Future  -     Lipid panel; Future  -     TSH, 3rd generation with Free T4 reflex; Future    Need for hepatitis C screening test  -     PSA, total and free; Future  -     CBC and differential; Future  -     Comprehensive metabolic panel; Future  -     Lipid panel; Future  -     TSH, 3rd generation with Free T4 reflex; Future  -     Hepatitis C Antibody ([facility]); Future    Screening for HIV (human immunodeficiency virus)  -     PSA, total and free; Future  -     CBC and differential; Future  -     Comprehensive metabolic panel; Future  -     Lipid panel; Future  -     TSH, 3rd generation with Free T4 reflex; Future  -     HIV 1/2 Antigen/Antibody (4th Generation) w Reflex [facility]; Future    Other orders  -     Coenzyme Q10 100 MG TABS; Take 1 tablet by mouth daily  -     carbidopa-levodopa (SINEMET)  mg per tablet; Take 1 tablet by mouth Three times a day  -     carbidopa (LODOSYN) 25 MG tablet;  Take 25 mg by mouth Three times a day  -     aspirin (ECOTRIN LOW STRENGTH) 81 mg EC tablet; Take 81 mg by mouth daily  -     Multiple Vitamins-Minerals (MENS MULTIVITAMIN PO); Take 1 tablet by mouth daily     patient had a similar complaints in August 2021 and was evaluated by Urology with Imaging, cystoscopy and urine tests which were all unremarkable, reviewed these findings again with the patient  Office urine analysis was negative for infection or blood  Will send out for UA and culture  If negative will plan for repeat urinalysis in 1 month time and possible urology referral     BMI Counseling: Body mass index is 25 24 kg/m²  The BMI is above normal  Nutrition recommendations include decreasing portion sizes  Exercise recommendations include vigorous physical activity 75 minutes/week  No pharmacotherapy was ordered  Rationale for BMI follow-up plan is due to patient being overweight or obese  There are no Patient Instructions on file for this visit  Subjective:      Patient ID: Cj Mack is a 49 y o  male    Patient is a limited in history because of his medical condition and also he speaks [address]  He is helped by his partner who speaks good angulation  She reports that patient had complained of blood in his urine in the last week, she noticed some tinge of blood in his urine  Blood in Urine  This is a recurrent problem  The current episode started in the past 7 days  The problem has been resolved since onset  He describes the hematuria as gross hematuria  He reports no clotting in his urine stream  His pain is at a severity of 0/10  He is experiencing no pain  Urine color: tinged  Irritative symptoms do not include frequency, nocturia or urgency  Obstructive symptoms do not include dribbling, incomplete emptying, an intermittent stream, a slower stream or straining  Pertinent negatives include no abdominal pain, bladder pain, bone pain, chills, dysuria, fever, flank pain, genital pain or nausea   There is no history of BPH, kidney stones, recent infection or sickle cell disease  Risk factors include aspirin           Current Outpatient Medications:     aspirin (ECOTRIN LOW STRENGTH) 81 mg EC tablet, Take 81 mg by mouth daily, Disp: , Rfl:     carbidopa (LODOSYN) 25 MG tablet, Take 25 mg by mouth Three times a day, Disp: , Rfl:     carbidopa-levodopa (SINEMET)  mg per tablet, Take 1 tablet by mouth Three times a day, Disp: , Rfl:     Coenzyme Q10 100 MG TABS, Take 1 tablet by mouth daily, Disp: , Rfl:     meclizine (ANTIVERT) 25 mg tablet, Take 1 tablet (25 mg total) by mouth every 8 (eight) hours as needed for dizziness, Disp: 30 tablet, Rfl: 0    meloxicam (MOBIC) 15 mg tablet, Take 1 tablet (15 mg total) by mouth daily, Disp: 30 tablet, Rfl: 5    Multiple Vitamin (MULTIVITAMINS PO), Take by mouth, Disp: , Rfl:     Multiple Vitamins-Minerals (MENS MULTIVITAMIN PO), Take 1 tablet by mouth daily, Disp: , Rfl:     aspirin 81 mg chewable tablet, Chew 1 tablet (81 mg total) daily for 60 days, Disp: 30 tablet, Rfl: 1    lidocaine (LIDODERM) 5 %, Apply 1 patch topically daily Remove & Discard patch within 12 hours or as directed by MD (Patient not taking: Reported on 7/18/2022), Disp: 30 patch, Rfl: 0    tiZANidine (ZANAFLEX) 4 mg tablet, Take 1 tablet (4 mg total) by mouth daily at bedtime (Patient not taking: Reported on 7/18/2022), Disp: 30 tablet, Rfl: 5     Past Medical History:   Diagnosis Date    Hyperlipidemia     Migraine     Stroke ([facility] [address] )     possibleTIA         Past Surgical History:   Procedure Laterality Date    FACIAL FRACTURE SURGERY           No Known Allergies    Recent Results (from the past 1008 hour(s))   POCT urine dip    Collection Time: 07/18/22 12:28 PM   Result Value Ref Range    LEUKOCYTE ESTERASE,UA 15 rina/uL     NITRITE,UA negative     SL AMB POCT UROBILINOGEN 0 2 mg/dl     POCT URINE PROTEIN 15 mg/dl      PH,UA 6 0     BLOOD,UA negative     SPECIFIC GRAVITY,UA 1 030 KETONES,UA negative     BILIRUBIN,UA negative     GLUCOSE, UA negative      COLOR,UA dark yellow     CLARITY,UA cloudy    UA w Reflex to Microscopic w Reflex to Culture    Collection Time: 07/18/22 12:47 PM    Specimen: Urine, Clean Catch   Result Value Ref Range    Color, UA Yellow     Clarity, UA Clear     Specific Gravity, UA 1 025 1 003 - 1 030    pH, UA 6 0 4 5, 5 0, 5 5, 6 0, 6 5, 7 0, 7 5, 8 0    Leukocytes, UA Negative Negative    Nitrite, UA Negative Negative    Protein, UA Trace (A) Negative mg/dl    Glucose, UA Negative Negative mg/dl    Ketones, UA Negative Negative mg/dl    Urobilinogen, UA <2 0 <2 0 mg/dl mg/dl    Bilirubin, UA Negative Negative    Occult Blood, UA Negative Negative   Urine Microscopic    Collection Time: 07/18/22 12:47 PM   Result Value Ref Range    RBC, UA 2-4 (A) None Seen, 1-2 /hpf    WBC, UA 1-2 None Seen, 1-2 /hpf    Epithelial Cells Occasional None Seen, Occasional /hpf    Bacteria, UA None Seen None Seen, Occasional /hpf    MUCUS THREADS Occasional (A) None Seen       The following portions of the patient's history were reviewed and updated as appropriate: allergies, current medications, past family history, past medical history, past social history, past surgical history and problem list      Review of Systems   Constitutional: Negative for activity change, appetite change, chills, diaphoresis, fatigue, fever and unexpected weight change  HENT: Negative for congestion and sore throat  Respiratory: Negative for apnea, cough, choking, chest tightness, shortness of breath, wheezing and stridor  Cardiovascular: Negative for chest pain, palpitations and leg swelling  Gastrointestinal: Negative for abdominal distention, abdominal pain, blood in stool, constipation, nausea and rectal pain  Genitourinary: Positive for hematuria  Negative for dysuria, flank pain, frequency, incomplete emptying, nocturia and urgency     Musculoskeletal: Negative for arthralgias, back pain, gait problem, joint swelling and myalgias  Skin: Negative for color change, pallor and rash  Neurological: Negative for headaches  Objective:      Vitals:    07/18/22 1146   BP: 130/80   Pulse: 64   Temp: 97 9 °F (36 6 °C)   SpO2: 97%          Physical Exam  Vitals reviewed  Constitutional:       General: He is not in acute distress  Appearance: Normal appearance  He is not ill-appearing, toxic-appearing or diaphoretic  HENT:      Mouth/Throat:      Mouth: Mucous membranes are moist    Cardiovascular:      Rate and Rhythm: Normal rate and regular rhythm  Pulses: Normal pulses  Heart sounds: Normal heart sounds  No murmur heard  No friction rub  No gallop  Pulmonary:      Effort: Pulmonary effort is normal  No respiratory distress  Breath sounds: Normal breath sounds  No stridor  No wheezing, rhonchi or rales  Chest:      Chest wall: No tenderness  Abdominal:      Palpations: Abdomen is soft  Tenderness: There is no right CVA tenderness or left CVA tenderness  Musculoskeletal:         General: No swelling or tenderness  Right lower leg: No edema  Left lower leg: No edema  Skin:     General: Skin is warm and dry  Findings: No lesion or rash  Neurological:      Mental Status: He is alert and oriented to person, place, and time  tenderness  Abdominal:      Palpations: Abdomen is soft  Tenderness: There is no right CVA tenderness or left CVA tenderness  Musculoskeletal:         General: No swelling or tenderness  Right lower leg: No edema  Left lower leg: No edema  Skin:     General: Skin is warm and dry  Findings: No lesion or rash  Neurological:      Mental Status: He is alert and oriented to person, place, and time

## 2022-09-26 ENCOUNTER — LAB (OUTPATIENT)
Dept: LAB | Facility: CLINIC | Age: 52
End: 2022-09-26
Payer: COMMERCIAL

## 2022-09-26 DIAGNOSIS — R31.9 HEMATURIA, UNSPECIFIED TYPE: ICD-10-CM

## 2022-09-26 DIAGNOSIS — Z11.4 SCREENING FOR HIV (HUMAN IMMUNODEFICIENCY VIRUS): ICD-10-CM

## 2022-09-26 DIAGNOSIS — Z11.59 NEED FOR HEPATITIS C SCREENING TEST: ICD-10-CM

## 2022-09-26 DIAGNOSIS — G20 PARKINSON'S DISEASE (TREMOR, STIFFNESS, SLOW MOTION, UNSTABLE POSTURE) (HCC): ICD-10-CM

## 2022-09-26 DIAGNOSIS — Z12.9 SCREENING FOR CANCER: ICD-10-CM

## 2022-09-26 LAB
ALBUMIN SERPL BCP-MCNC: 3.6 G/DL (ref 3.5–5)
ALP SERPL-CCNC: 63 U/L (ref 46–116)
ALT SERPL W P-5'-P-CCNC: 24 U/L (ref 12–78)
ANION GAP SERPL CALCULATED.3IONS-SCNC: 5 MMOL/L (ref 4–13)
AST SERPL W P-5'-P-CCNC: 10 U/L (ref 5–45)
BASOPHILS # BLD AUTO: 0.03 THOUSANDS/ΜL (ref 0–0.1)
BASOPHILS NFR BLD AUTO: 0 % (ref 0–1)
BILIRUB SERPL-MCNC: 0.58 MG/DL (ref 0.2–1)
BUN SERPL-MCNC: 17 MG/DL (ref 5–25)
CALCIUM SERPL-MCNC: 9.4 MG/DL (ref 8.3–10.1)
CHLORIDE SERPL-SCNC: 106 MMOL/L (ref 96–108)
CHOLEST SERPL-MCNC: 269 MG/DL
CO2 SERPL-SCNC: 26 MMOL/L (ref 21–32)
CREAT SERPL-MCNC: 1.18 MG/DL (ref 0.6–1.3)
EOSINOPHIL # BLD AUTO: 0.07 THOUSAND/ΜL (ref 0–0.61)
EOSINOPHIL NFR BLD AUTO: 1 % (ref 0–6)
ERYTHROCYTE [DISTWIDTH] IN BLOOD BY AUTOMATED COUNT: 12.1 % (ref 11.6–15.1)
GFR SERPL CREATININE-BSD FRML MDRD: 71 ML/MIN/1.73SQ M
GLUCOSE P FAST SERPL-MCNC: 99 MG/DL (ref 65–99)
HCT VFR BLD AUTO: 46.6 % (ref 36.5–49.3)
HCV AB SER QL: NORMAL
HDLC SERPL-MCNC: 40 MG/DL
HGB BLD-MCNC: 15.6 G/DL (ref 12–17)
IMM GRANULOCYTES # BLD AUTO: 0.06 THOUSAND/UL (ref 0–0.2)
IMM GRANULOCYTES NFR BLD AUTO: 1 % (ref 0–2)
LDLC SERPL CALC-MCNC: 168 MG/DL (ref 0–100)
LYMPHOCYTES # BLD AUTO: 2.39 THOUSANDS/ΜL (ref 0.6–4.47)
LYMPHOCYTES NFR BLD AUTO: 25 % (ref 14–44)
MCH RBC QN AUTO: 30.9 PG (ref 26.8–34.3)
MCHC RBC AUTO-ENTMCNC: 33.5 G/DL (ref 31.4–37.4)
MCV RBC AUTO: 92 FL (ref 82–98)
MONOCYTES # BLD AUTO: 0.82 THOUSAND/ΜL (ref 0.17–1.22)
MONOCYTES NFR BLD AUTO: 9 % (ref 4–12)
NEUTROPHILS # BLD AUTO: 6.15 THOUSANDS/ΜL (ref 1.85–7.62)
NEUTS SEG NFR BLD AUTO: 64 % (ref 43–75)
NONHDLC SERPL-MCNC: 229 MG/DL
NRBC BLD AUTO-RTO: 0 /100 WBCS
PLATELET # BLD AUTO: 277 THOUSANDS/UL (ref 149–390)
PMV BLD AUTO: 10.3 FL (ref 8.9–12.7)
POTASSIUM SERPL-SCNC: 4.1 MMOL/L (ref 3.5–5.3)
PROT SERPL-MCNC: 8 G/DL (ref 6.4–8.4)
RBC # BLD AUTO: 5.05 MILLION/UL (ref 3.88–5.62)
SODIUM SERPL-SCNC: 137 MMOL/L (ref 135–147)
TRIGL SERPL-MCNC: 307 MG/DL
TSH SERPL DL<=0.05 MIU/L-ACNC: 3.92 UIU/ML (ref 0.45–4.5)
WBC # BLD AUTO: 9.52 THOUSAND/UL (ref 4.31–10.16)

## 2022-09-26 PROCEDURE — 85025 COMPLETE CBC W/AUTO DIFF WBC: CPT

## 2022-09-26 PROCEDURE — 84153 ASSAY OF PSA TOTAL: CPT

## 2022-09-26 PROCEDURE — 84443 ASSAY THYROID STIM HORMONE: CPT

## 2022-09-26 PROCEDURE — 80061 LIPID PANEL: CPT

## 2022-09-26 PROCEDURE — 36415 COLL VENOUS BLD VENIPUNCTURE: CPT

## 2022-09-26 PROCEDURE — 84154 ASSAY OF PSA FREE: CPT

## 2022-09-26 PROCEDURE — 87389 HIV-1 AG W/HIV-1&-2 AB AG IA: CPT

## 2022-09-26 PROCEDURE — 86803 HEPATITIS C AB TEST: CPT

## 2022-09-26 PROCEDURE — 80053 COMPREHEN METABOLIC PANEL: CPT

## 2022-09-27 LAB
HIV 1+2 AB+HIV1 P24 AG SERPL QL IA: NORMAL
PSA FREE MFR SERPL: 38 %
PSA FREE SERPL-MCNC: 0.19 NG/ML
PSA SERPL-MCNC: 0.5 NG/ML (ref 0–4)

## 2022-09-28 NOTE — RESULT ENCOUNTER NOTE
Your cholesterol levels are high, rest of blood work okay    Will discuss in detail in upcoming office visit

## 2022-10-03 ENCOUNTER — CONSULT (OUTPATIENT)
Dept: INTERNAL MEDICINE CLINIC | Facility: CLINIC | Age: 52
End: 2022-10-03
Payer: COMMERCIAL

## 2022-10-03 VITALS
BODY MASS INDEX: 25.76 KG/M2 | HEART RATE: 80 BPM | WEIGHT: 184 LBS | HEIGHT: 71 IN | SYSTOLIC BLOOD PRESSURE: 110 MMHG | TEMPERATURE: 98.4 F | DIASTOLIC BLOOD PRESSURE: 80 MMHG | OXYGEN SATURATION: 97 %

## 2022-10-03 DIAGNOSIS — Z78.9 STATIN INTOLERANCE: ICD-10-CM

## 2022-10-03 DIAGNOSIS — G20 PARKINSON'S DISEASE (TREMOR, STIFFNESS, SLOW MOTION, UNSTABLE POSTURE) (HCC): ICD-10-CM

## 2022-10-03 DIAGNOSIS — R21 SKIN RASH: ICD-10-CM

## 2022-10-03 DIAGNOSIS — I63.30 CEREBROVASCULAR ACCIDENT (CVA) DUE TO THROMBOSIS OF CEREBRAL ARTERY (HCC): ICD-10-CM

## 2022-10-03 DIAGNOSIS — Z01.818 PRE-OP EXAMINATION: Primary | ICD-10-CM

## 2022-10-03 DIAGNOSIS — S06.9X5S TRAUMATIC BRAIN INJURY, WITH LOSS OF CONSCIOUSNESS GREATER THAN 24 HOURS WITH RETURN TO PRE-EXISTING CONSCIOUS LEVEL, SEQUELA (HCC): ICD-10-CM

## 2022-10-03 DIAGNOSIS — E78.2 MIXED HYPERLIPIDEMIA: ICD-10-CM

## 2022-10-03 DIAGNOSIS — M54.16 LUMBAR RADICULOPATHY: ICD-10-CM

## 2022-10-03 PROBLEM — M47.816 LUMBAR SPONDYLOSIS: Status: ACTIVE | Noted: 2021-07-19

## 2022-10-03 PROCEDURE — 99214 OFFICE O/P EST MOD 30 MIN: CPT | Performed by: INTERNAL MEDICINE

## 2022-10-03 RX ORDER — ROSUVASTATIN CALCIUM 5 MG/1
5 TABLET, COATED ORAL DAILY
Qty: 90 TABLET | Refills: 0 | Status: SHIPPED | OUTPATIENT
Start: 2022-10-03

## 2022-10-03 NOTE — PROGRESS NOTES
Assessment/Plan:    Diagnoses and all orders for this visit:    Pre-op examination  -     Ambulatory Referral to Dermatology; Future    Cerebrovascular accident (CVA) due to thrombosis of cerebral artery (HCC)    Mixed hyperlipidemia  -     rosuvastatin (CRESTOR) 5 mg tablet; Take 1 tablet (5 mg total) by mouth daily    Traumatic brain injury, with loss of consciousness greater than 24 hours with return to pre-existing conscious level, sequela (HCC)    Statin intolerance  Comments:  Notes subjective intolerance to statin  Would recommend starting on an alternate medication in view of cholesterol levels  Patient and partner agreed    Skin rash  Comments:  Unclear etiology, lesions on the back and on the thighs  Will refer to Dermatology in view of impending spinal surgery  Orders:  -     Ambulatory Referral to Dermatology; Future  -     Ambulatory Referral to Dermatology; Future    Lumbar radiculopathy    Parkinson's disease (tremor, stiffness, slow motion, unstable posture) (Formerly [facility])  Comments:  Stable, patient notes symptoms are better controlled now  Follows up with the [facility] neurology       RCRI risk of class to, with  6% risk of death MI or cardiac arrest in 30 days  Patient is intermediate risk for intermediate risk procedure  In view of skin lesions on the back, recommend to get dermatology opinion prior to surgery  Recent blood work and EKG from [facility] reviewed        Hold aspirin 5 days prior to surgery  Depression Screening and Follow-up Plan: Patient was screened for depression during today's encounter  They screened negative with a PHQ-2 score of 0  There are no Patient Instructions on file for this visit      Subjective:      Patient ID: Malini Bee is a 47 y o  male    Patient comes for preoperative examination prior to surgery on 10/07/2022 by Dr Leonora Contreras, EKG & bw was done on 09/23/2022)        Current Outpatient Medications:     aspirin (ECOTRIN LOW STRENGTH) 81 mg EC tablet, Take 81 mg by mouth daily, Disp: , Rfl:     carbidopa (LODOSYN) 25 MG tablet, Take 25 mg by mouth 3 (three) times a day as needed, Disp: , Rfl:     carbidopa-levodopa (SINEMET)  mg per tablet, Take 1 tablet by mouth 3 (three) times a day, Disp: , Rfl:     Coenzyme Q10 100 MG TABS, Take 1 tablet by mouth daily, Disp: , Rfl:     meclizine (ANTIVERT) 25 mg tablet, Take 1 tablet (25 mg total) by mouth every 8 (eight) hours as needed for dizziness, Disp: 30 tablet, Rfl: 0    rosuvastatin (CRESTOR) 5 mg tablet, Take 1 tablet (5 mg total) by mouth daily, Disp: 90 tablet, Rfl: 0    lidocaine (LIDODERM) 5 %, Apply 1 patch topically daily Remove & Discard patch within 12 hours or as directed by MD (Patient not taking: No sig reported), Disp: 30 patch, Rfl: 0    meloxicam (MOBIC) 15 mg tablet, Take 1 tablet (15 mg total) by mouth daily (Patient not taking: No sig reported), Disp: 30 tablet, Rfl: 5    Multiple Vitamin (MULTIVITAMINS PO), Take by mouth (Patient not taking: Reported on 10/3/2022), Disp: , Rfl:     tiZANidine (ZANAFLEX) 4 mg tablet, Take 1 tablet (4 mg total) by mouth daily at bedtime (Patient not taking: No sig reported), Disp: 30 tablet, Rfl: 5     Past Medical History:   Diagnosis Date    Hyperlipidemia     Migraine     Stroke (HCC)     possibleTIA         Past Surgical History:   Procedure Laterality Date    FACIAL FRACTURE SURGERY           No Known Allergies    Recent Results (from the past 1008 hour(s))   PSA, total and free    Collection Time: 09/26/22  9:48 AM   Result Value Ref Range    Prostate Specific Antigen Total 0 5 0 0 - 4 0 ng/mL    PSA, Free 0 19 N/A ng/mL    PSA, Free Pct 38 0 %   CBC and differential    Collection Time: 09/26/22  9:48 AM   Result Value Ref Range    WBC 9 52 4 31 - 10 16 Thousand/uL    RBC 5 05 3 88 - 5 62 Million/uL    Hemoglobin 15 6 12 0 - 17 0 g/dL    Hematocrit 46 6 36 5 - 49 3 %    MCV 92 82 - 98 fL    MCH 30 9 26 8 - 34 3 pg    MCHC 33 5 31 4 - 37 4 g/dL    RDW 12 1 11 6 - 15 1 %    MPV 10 3 8 9 - 12 7 fL    Platelets 927 626 - 587 Thousands/uL    nRBC 0 /100 WBCs    Neutrophils Relative 64 43 - 75 %    Immat GRANS % 1 0 - 2 %    Lymphocytes Relative 25 14 - 44 %    Monocytes Relative 9 4 - 12 %    Eosinophils Relative 1 0 - 6 %    Basophils Relative 0 0 - 1 %    Neutrophils Absolute 6 15 1 85 - 7 62 Thousands/µL    Immature Grans Absolute 0 06 0 00 - 0 20 Thousand/uL    Lymphocytes Absolute 2 39 0 60 - 4 47 Thousands/µL    Monocytes Absolute 0 82 0 17 - 1 22 Thousand/µL    Eosinophils Absolute 0 07 0 00 - 0 61 Thousand/µL    Basophils Absolute 0 03 0 00 - 0 10 Thousands/µL   Comprehensive metabolic panel    Collection Time: 09/26/22  9:48 AM   Result Value Ref Range    Sodium 137 135 - 147 mmol/L    Potassium 4 1 3 5 - 5 3 mmol/L    Chloride 106 96 - 108 mmol/L    CO2 26 21 - 32 mmol/L    ANION GAP 5 4 - 13 mmol/L    BUN 17 5 - 25 mg/dL    Creatinine 1 18 0 60 - 1 30 mg/dL    Glucose, Fasting 99 65 - 99 mg/dL    Calcium 9 4 8 3 - 10 1 mg/dL    AST 10 5 - 45 U/L    ALT 24 12 - 78 U/L    Alkaline Phosphatase 63 46 - 116 U/L    Total Protein 8 0 6 4 - 8 4 g/dL    Albumin 3 6 3 5 - 5 0 g/dL    Total Bilirubin 0 58 0 20 - 1 00 mg/dL    eGFR 71 ml/min/1 73sq m   Lipid panel    Collection Time: 09/26/22  9:48 AM   Result Value Ref Range    Cholesterol 269 (H) See Comment mg/dL    Triglycerides 307 (H) See Comment mg/dL    HDL, Direct 40 >=40 mg/dL    LDL Calculated 168 (H) 0 - 100 mg/dL    Non-HDL-Chol (CHOL-HDL) 229 mg/dl   TSH, 3rd generation with Free T4 reflex    Collection Time: 09/26/22  9:48 AM   Result Value Ref Range    TSH 3RD GENERATON 3 920 0 450 - 4 500 uIU/mL   Hepatitis C Antibody ([facility])    Collection Time: 09/26/22  9:48 AM   Result Value Ref Range    Hepatitis C Ab Non-reactive Non-reactive   HIV 1/2 Antigen/Antibody (4th Generation) w Reflex [facility]    Collection Time: 09/26/22  9:48 AM   Result Value Ref Range    HIV-1/HIV-2 Ab Non-Reactive Non-Reactive       The following portions of the patient's history were reviewed and updated as appropriate: allergies, current medications, past family history, past medical history, past social history, past surgical history and problem list      Review of Systems   Constitutional: Negative for activity change, appetite change, chills, diaphoresis, fatigue, fever and unexpected weight change  HENT: Negative for congestion and sore throat  Respiratory: Negative for apnea, cough, choking, chest tightness, shortness of breath, wheezing and stridor  Cardiovascular: Negative for chest pain, palpitations and leg swelling  Gastrointestinal: Negative for abdominal distention, abdominal pain, blood in stool, constipation, nausea and rectal pain  Genitourinary: Negative for dysuria, flank pain, frequency and urgency  Musculoskeletal: Negative for arthralgias, back pain, gait problem, joint swelling and myalgias  Skin: Positive for rash  Negative for color change and pallor  Neurological: Negative for headaches  Objective:      Vitals:    10/03/22 1231   BP: 110/80   Pulse: 80   Temp: 98 4 °F (36 9 °C)   SpO2: 97%                  Physical Exam  Vitals reviewed  Constitutional:       General: He is not in acute distress  Appearance: Normal appearance  He is not ill-appearing, toxic-appearing or diaphoretic  HENT:      Mouth/Throat:      Mouth: Mucous membranes are moist    Cardiovascular:      Rate and Rhythm: Normal rate and regular rhythm  Pulses: Normal pulses  Heart sounds: Normal heart sounds  No murmur heard  No friction rub  No gallop  Pulmonary:      Effort: Pulmonary effort is normal  No respiratory distress  Breath sounds: Normal breath sounds  No stridor  No wheezing, rhonchi or rales  Chest:      Chest wall: No tenderness  Abdominal:      General: There is no distension  Palpations: Abdomen is soft   There is no mass       Tenderness: There is no abdominal tenderness  There is no rebound  Musculoskeletal:         General: No swelling or tenderness  Right lower leg: No edema  Left lower leg: No edema  Skin:     General: Skin is warm and dry  Findings: Rash present  No lesion  Neurological:      Mental Status: He is alert and oriented to person, place, and time

## 2022-10-04 ENCOUNTER — CONSULT (OUTPATIENT)
Dept: DERMATOLOGY | Facility: CLINIC | Age: 52
End: 2022-10-04
Payer: COMMERCIAL

## 2022-10-04 ENCOUNTER — TELEPHONE (OUTPATIENT)
Dept: INTERNAL MEDICINE CLINIC | Facility: CLINIC | Age: 52
End: 2022-10-04

## 2022-10-04 VITALS — HEIGHT: 71 IN | BODY MASS INDEX: 25.76 KG/M2 | WEIGHT: 184 LBS | TEMPERATURE: 97.1 F

## 2022-10-04 DIAGNOSIS — Z01.818 PRE-OP EXAMINATION: ICD-10-CM

## 2022-10-04 DIAGNOSIS — R21 SKIN RASH: ICD-10-CM

## 2022-10-04 DIAGNOSIS — L50.8 ACUTE URTICARIA: Primary | ICD-10-CM

## 2022-10-04 PROCEDURE — 99244 OFF/OP CNSLTJ NEW/EST MOD 40: CPT | Performed by: DERMATOLOGY

## 2022-10-04 NOTE — TELEPHONE ENCOUNTER
Patient say dermatologist today at [address] [place] [address]  Dermatologist cleared him for surgery  Can you please clear him also and fax the form to [facility]  Form should be in red folder at  to be signed

## 2022-10-04 NOTE — TELEPHONE ENCOUNTER
Patient's emergency contact called following up on this  Once the form has been completed and faxed, she would like a call to confirm this has been completed       # [phone]

## 2022-10-04 NOTE — PROGRESS NOTES
[facility] Note     Patient Name: Malini Bee  Encounter Date: 10/04/2022     Have you been cared for by a [facility] Dermatologist in the last 3 years and, if so, which one? No    · Have you traveled outside of the [address] [place] [address] in the past 3 months or outside of the [facility] area in the last 2 weeks? No     May we call your Preferred Phone number to discuss your specific medical information? Yes     May we leave a detailed message that includes your specific medical information? Yes      Today's Chief Concerns:   Concern #1:  Rash    Concern #2:      Past Medical History:  Have you personally ever had or currently have any of the following? · Skin cancer (such as Melanoma, Basal Cell Carcinoma, Squamous Cell Carcinoma? (If Yes, please provide more detail)- No  · Eczema: No  · Psoriasis: No  · HIV/AIDS: No  · Hepatitis B or C: No  · Tuberculosis: No  · Systemic Immunosuppression such as Diabetes, Biologic or Immunotherapy, Chemotherapy, Organ Transplantation, Bone Marrow Transplantation (If YES, please provide more detail): No  · Radiation Treatment (If YES, please provide more detail): No  · Any other major medical conditions/concerns? (If Yes, which types)- No    Social History:     What is/was your primary occupation? N/a      What are your hobbies/past-times? N/a     Family History:  Have any of your "first degree relatives" (parent, brother, sister, or child) had any of the following       · Skin cancer such as Melanoma or Merkel Cell Carcinoma or Pancreatic Cancer? No  · Eczema, Asthma, Hay Fever or Seasonal Allergies: No  · Psoriasis or Psoriatic Arthritis: No  · Do any other medical conditions seem to run in your family? If Yes, what condition and which relatives?   No    Current Medications:       Current Outpatient Medications:     aspirin (ECOTRIN LOW STRENGTH) 81 mg EC tablet, Take 81 mg by mouth daily, Disp: , Rfl:     carbidopa (LODOSYN) 25 MG tablet, Take 25 mg by mouth 3 (three) times a day as needed, Disp: , Rfl:     Coenzyme Q10 100 MG TABS, Take 1 tablet by mouth daily, Disp: , Rfl:     meclizine (ANTIVERT) 25 mg tablet, Take 1 tablet (25 mg total) by mouth every 8 (eight) hours as needed for dizziness, Disp: 30 tablet, Rfl: 0    rosuvastatin (CRESTOR) 5 mg tablet, Take 1 tablet (5 mg total) by mouth daily, Disp: 90 tablet, Rfl: 0    carbidopa-levodopa (SINEMET)  mg per tablet, Take 1 tablet by mouth 3 (three) times a day, Disp: , Rfl:     lidocaine (LIDODERM) 5 %, Apply 1 patch topically daily Remove & Discard patch within 12 hours or as directed by MD (Patient not taking: No sig reported), Disp: 30 patch, Rfl: 0    meloxicam (MOBIC) 15 mg tablet, Take 1 tablet (15 mg total) by mouth daily (Patient not taking: No sig reported), Disp: 30 tablet, Rfl: 5    Multiple Vitamin (MULTIVITAMINS PO), Take by mouth (Patient not taking: Reported on 10/3/2022), Disp: , Rfl:     tiZANidine (ZANAFLEX) 4 mg tablet, Take 1 tablet (4 mg total) by mouth daily at bedtime (Patient not taking: No sig reported), Disp: 30 tablet, Rfl: 5      Review of Systems:  Have you recently had or currently have any of the following? If YES, what are you doing for the problem? · Fever, chills or unintended weight loss: No  · Sudden loss or change in your vision: No  · Nausea, vomiting or blood in your stool: No  · Painful or swollen joints: No  · Wheezing or cough: No  · Changing mole or non-healing wound: No  · Nosebleeds: No  · Excessive sweating: No  · Easy or prolonged bleeding? No  · Over the last 2 weeks, how often have you been bothered by the following problems? · Taking little interest or pleasure in doing things: 1 - Not at All  · Feeling down, depressed, or hopeless: 1 - Not at All  · Rapid heartbeat with epinephrine:  No        · Any known allergies?       No Known Allergies      Physical Exam:     Was a chaperone (Derm Clinical Assistant) present throughout the entire Physical Exam? Yes        CONSTITUTIONAL:   Vitals:    10/04/22 1102   Temp: (!) 97 1 °F (36 2 °C)   TempSrc: Temporal   Weight: 83 5 kg (184 lb)   Height: 5' 11" (1 803 m)       PSYCH: Normal mood and affect  EYES: Normal conjunctiva  ENT: Normal lips and oral mucosa  CARDIOVASCULAR: No edema  RESPIRATORY: Normal respirations  HEME/LYMPH/IMMUNO:  No regional lymphadenopathy except as noted below in "ASSESSMENT AND PLAN BY DIAGNOSIS"    SKIN:  FULL ORGAN SYSTEM EXAM   Hair, Scalp, Ears, Face Normal except as noted below in Assessment   Neck, Cervical Chain Nodes Normal except as noted below in Assessment   Right Arm/Hand/Fingers Normal except as noted below in Assessment   Left Arm/Hand/Fingers Normal except as noted below in Assessment   Chest/Breasts/Axillae Viewed areas Normal except as noted below in Assessment   Abdomen, Umbilicus Normal except as noted below in Assessment   Back/Spine Normal except as noted below in Assessment   Groin/Genitalia/Buttocks NOT EXAMINED   Right Leg, Foot, Toes Normal except as noted below in Assessment   Left Leg, Foot, Toes Normal except as noted below in Assessment                    Assessment and Plan by Diagnosis:    History of Present Condition:     Duration:  How long has this been an issue for you?    o  1 week    Location Affected:  Where on the body is this affecting you?    o  all over    Quality:  Is there any bleeding, pain, itch, burning/irritation, or redness associated with the skin lesion? o  redness,    Severity:  Describe any bleeding, pain, itch, burning/irritation, or redness on a scale of 1 to 10 (with 10 being the worst)  o     Timing:  Does this condition seem to be there pretty constantly or do you notice it more at specific times throughout the day?     o  constant   Context:  Have you ever noticed that this condition seems to be associated with specific activities you do?    o Denies    Modifying Factors:    o Anything that seems to make the condition worse?    -  unsure   o What have you tried to do to make the condition better?    -  benadryl gel and cetirizine    Associated Signs and Symptoms:  Does this skin lesion seem to be associated with any of the following:  o  SL AMB DERM SIGNS AND SYMPTOMS: Redness and Itching and Scratching      ACUTE URTICARIA     Physical Exam:   (Anatomic Location); (Size and Morphological Description); (Differential Diagnosis):  o Bilateral arms, legs, back    Pertinent Positives:   Pertinent Negatives: Additional History of Present Condition:  She states they were prepping for herniated disk surgery which is on hold now due to current rash  Rash started about 1 week ago  She has been applying benadryl gel cream, cetrizine tablets twice a day  Partner stated he did have a similar rash in the past while working in the [employer]      Assessment and Plan:  Based on a thorough discussion of this condition and the management approach to it (including a comprehensive discussion of the known risks, side effects and potential benefits of treatment), the patient (family) agrees to implement the following specific plan:   Reassured the patient that acute urticaria can start after eating certain foods, medications, stress or recent illness or can be idiopathic    If there is trouble with sleeping start benadryl by mouth at bedtime only as needed    Continue Cetrizine 10 mg 2 pills by mouth twice a day for itching for at least 6 weeks    If no improvement within 6 weeks then additional labs will be needed for chronic urticaria           Scribe Attestation    I,:  Christviola Princeam am acting as a scribe while in the presence of the attending physician :       I,:  Mulu Wolfe MD personally performed the services described in this documentation    as scribed in my presence :         Lou Syed

## 2022-10-04 NOTE — PATIENT INSTRUCTIONS
ACUTE URTICARIA     Assessment and Plan:  Based on a thorough discussion of this condition and the management approach to it (including a comprehensive discussion of the known risks, side effects and potential benefits of treatment), the patient (family) agrees to implement the following specific plan:  Reassured it can start after eating certain foods, medications, stress or recent illness   If there is trouble with sleeping start benadryl by mouth at bedtime only as needed   Continue Cetrizine 10 mg 2 pills by mouth twice a day for itching for at least 6 weeks  If no improvement within 6 weeks then additional labs will be needed   Reassured this condition is common  Reassured he is good for surgery   If surgeon needs to speak with provider have them reach out to Dr Bo Campos or Dr Tiffani Burrell

## 2022-12-09 DIAGNOSIS — E78.2 MIXED HYPERLIPIDEMIA: ICD-10-CM

## 2022-12-09 RX ORDER — ROSUVASTATIN CALCIUM 5 MG/1
5 TABLET, COATED ORAL DAILY
Qty: 90 TABLET | Refills: 0 | Status: SHIPPED | OUTPATIENT
Start: 2022-12-09 | End: 2022-12-12 | Stop reason: SDUPTHER

## 2022-12-12 DIAGNOSIS — E78.2 MIXED HYPERLIPIDEMIA: ICD-10-CM

## 2022-12-12 RX ORDER — ROSUVASTATIN CALCIUM 5 MG/1
5 TABLET, COATED ORAL DAILY
Qty: 90 TABLET | Refills: 1 | Status: SHIPPED | OUTPATIENT
Start: 2022-12-12

## 2023-03-21 ENCOUNTER — HOSPITAL ENCOUNTER (EMERGENCY)
Facility: HOSPITAL | Age: 53
Discharge: HOME | End: 2023-03-21
Attending: EMERGENCY MEDICINE

## 2023-03-21 VITALS
HEART RATE: 86 BPM | OXYGEN SATURATION: 96 % | RESPIRATION RATE: 14 BRPM | TEMPERATURE: 98.1 F | SYSTOLIC BLOOD PRESSURE: 129 MMHG | DIASTOLIC BLOOD PRESSURE: 88 MMHG

## 2023-03-21 DIAGNOSIS — R11.2 NAUSEA AND VOMITING: ICD-10-CM

## 2023-03-21 DIAGNOSIS — R10.13 EPIGASTRIC ABDOMINAL PAIN: Primary | ICD-10-CM

## 2023-03-21 LAB
ALBUMIN SERPL BCP-MCNC: 3.9 G/DL (ref 3.5–5)
ALP SERPL-CCNC: 49 U/L (ref 46–116)
ALT SERPL W P-5'-P-CCNC: 27 U/L (ref 12–78)
ANION GAP SERPL CALCULATED.3IONS-SCNC: 2 MMOL/L (ref 4–13)
AST SERPL W P-5'-P-CCNC: 23 U/L (ref 5–45)
ATRIAL RATE: 77 BPM
BASOPHILS # BLD AUTO: 0.04 THOUSANDS/ÂΜL (ref 0–0.1)
BASOPHILS NFR BLD AUTO: 1 % (ref 0–1)
BILIRUB SERPL-MCNC: 0.81 MG/DL (ref 0.2–1)
BUN SERPL-MCNC: 12 MG/DL (ref 5–25)
CALCIUM SERPL-MCNC: 9 MG/DL (ref 8.3–10.1)
CHLORIDE SERPL-SCNC: 111 MMOL/L (ref 96–108)
CO2 SERPL-SCNC: 27 MMOL/L (ref 21–32)
CREAT SERPL-MCNC: 0.76 MG/DL (ref 0.6–1.3)
EOSINOPHIL # BLD AUTO: 0.08 THOUSAND/ÂΜL (ref 0–0.61)
EOSINOPHIL NFR BLD AUTO: 1 % (ref 0–6)
ERYTHROCYTE [DISTWIDTH] IN BLOOD BY AUTOMATED COUNT: 12.2 % (ref 11.6–15.1)
GFR SERPL CREATININE-BSD FRML MDRD: 105 ML/MIN/1.73SQ M
GLUCOSE SERPL-MCNC: 117 MG/DL (ref 65–140)
HCT VFR BLD AUTO: 43.3 % (ref 36.5–49.3)
HGB BLD-MCNC: 14.8 G/DL (ref 12–17)
IMM GRANULOCYTES # BLD AUTO: 0.03 THOUSAND/UL (ref 0–0.2)
IMM GRANULOCYTES NFR BLD AUTO: 0 % (ref 0–2)
LIPASE SERPL-CCNC: 72 U/L (ref 73–393)
LYMPHOCYTES # BLD AUTO: 1.37 THOUSANDS/ÂΜL (ref 0.6–4.47)
LYMPHOCYTES NFR BLD AUTO: 18 % (ref 14–44)
MCH RBC QN AUTO: 31.6 PG (ref 26.8–34.3)
MCHC RBC AUTO-ENTMCNC: 34.2 G/DL (ref 31.4–37.4)
MCV RBC AUTO: 93 FL (ref 82–98)
MONOCYTES # BLD AUTO: 0.75 THOUSAND/ÂΜL (ref 0.17–1.22)
MONOCYTES NFR BLD AUTO: 10 % (ref 4–12)
NEUTROPHILS # BLD AUTO: 5.46 THOUSANDS/ÂΜL (ref 1.85–7.62)
NEUTS SEG NFR BLD AUTO: 70 % (ref 43–75)
NRBC BLD AUTO-RTO: 0 /100 WBCS
P AXIS: 46 DEGREES
PLATELET # BLD AUTO: 263 THOUSANDS/UL (ref 149–390)
PMV BLD AUTO: 9.8 FL (ref 8.9–12.7)
POTASSIUM SERPL-SCNC: 3.8 MMOL/L (ref 3.5–5.3)
PR INTERVAL: 138 MS
PROT SERPL-MCNC: 7 G/DL (ref 6.4–8.4)
QRS AXIS: 1 DEGREES
QRSD INTERVAL: 92 MS
QT INTERVAL: 392 MS
QTC INTERVAL: 443 MS
RBC # BLD AUTO: 4.68 MILLION/UL (ref 3.88–5.62)
SODIUM SERPL-SCNC: 140 MMOL/L (ref 135–147)
T WAVE AXIS: 21 DEGREES
VENTRICULAR RATE: 77 BPM
WBC # BLD AUTO: 7.73 THOUSAND/UL (ref 4.31–10.16)

## 2023-03-21 RX ORDER — SUCRALFATE 1 G/1
1 TABLET ORAL ONCE
Status: COMPLETED | OUTPATIENT
Start: 2023-03-21 | End: 2023-03-21

## 2023-03-21 RX ORDER — SUCRALFATE 1 G/1
1 TABLET ORAL 4 TIMES DAILY
Qty: 28 TABLET | Refills: 0 | Status: SHIPPED | OUTPATIENT
Start: 2023-03-21 | End: 2023-03-28

## 2023-03-21 RX ORDER — ONDANSETRON 4 MG/1
4 TABLET, ORALLY DISINTEGRATING ORAL EVERY 6 HOURS PRN
Qty: 6 TABLET | Refills: 0 | Status: SHIPPED | OUTPATIENT
Start: 2023-03-21

## 2023-03-21 RX ORDER — FAMOTIDINE 20 MG/1
20 TABLET, FILM COATED ORAL ONCE
Status: COMPLETED | OUTPATIENT
Start: 2023-03-21 | End: 2023-03-21

## 2023-03-21 RX ORDER — KETOROLAC TROMETHAMINE 30 MG/ML
15 INJECTION, SOLUTION INTRAMUSCULAR; INTRAVENOUS ONCE
Status: COMPLETED | OUTPATIENT
Start: 2023-03-21 | End: 2023-03-21

## 2023-03-21 RX ORDER — FAMOTIDINE 20 MG/1
20 TABLET, FILM COATED ORAL 2 TIMES DAILY
Qty: 14 TABLET | Refills: 0 | Status: SHIPPED | OUTPATIENT
Start: 2023-03-21 | End: 2023-03-28

## 2023-03-21 RX ORDER — ONDANSETRON 2 MG/ML
4 INJECTION INTRAMUSCULAR; INTRAVENOUS ONCE
Status: COMPLETED | OUTPATIENT
Start: 2023-03-21 | End: 2023-03-21

## 2023-03-21 RX ADMIN — ONDANSETRON 4 MG: 2 INJECTION INTRAMUSCULAR; INTRAVENOUS at 10:27

## 2023-03-21 RX ADMIN — SUCRALFATE 1 G: 1 TABLET ORAL at 10:27

## 2023-03-21 RX ADMIN — FAMOTIDINE 20 MG: 20 TABLET, FILM COATED ORAL at 10:34

## 2023-03-21 RX ADMIN — KETOROLAC TROMETHAMINE 15 MG: 30 INJECTION, SOLUTION INTRAMUSCULAR; INTRAVENOUS at 10:27

## 2023-03-21 RX ADMIN — SODIUM CHLORIDE 1000 ML: 0.9 INJECTION, SOLUTION INTRAVENOUS at 10:32

## 2023-03-21 NOTE — DISCHARGE INSTRUCTIONS
You were evaluated in the Emergency Department today for epigastric pain, which is most likely due to irritation of the lining of your stomach  Your symptoms improved with medication in the ED  Avoid spicy or acidic foods  I wrote you a prescription for carafate, zofran, and pepcid  This is at your pharmacy  Please take as prescribed  Please follow up with your primary care physician within two days  Return to the Emergency Department if you experience shortness of breath, worsening or uncontrolled abdominal or chest pain, headache, light headedness, feeling faint, nausea, vomiting, bloody vomit or stools, black tarry stools, or any other concerning symptoms  Thank you for choosing us for your care  Hoy lo evaluaron en el Departamento de Emergencias por dolor epigástrico, que probablemente se deba a la irritación del revestimiento del ARNULFO  Shruti síntomas mejoraron con la medicación en el servicio de [facility]  Evite los alimentos picantes o ácidos  Te escribí obdulia receta de carafate, zofran y pepcid  [place] es en [facility] farmacia  Por favor, tome según lo prescrito  Por favor, ashley un seguimiento con [facility] médico de atención primaria dentro de [address]  [place] al [employer] de Emergencias si experimenta dificultad para respirar, dolor abdominal o torácico que EDWARD o no se controla, dolor de Alycia Sullivan, sensación de [place], [facility], vómitos, vómitos o heces con [place], heces negras alquitranadas o cualquier otro síntoma preocupante  Johnny por elegirnos para knight cuidado

## 2023-03-21 NOTE — ED ATTENDING ATTESTATION
3/21/2023  Rob Casey DO, saw and evaluated the patient  I have discussed the patient with the resident/non-physician practitioner and agree with the resident's/non-physician practitioner's findings, Plan of Care, and MDM as documented in the resident's/non-physician practitioner's note, except where noted  All available labs and Radiology studies were reviewed  I was present for key portions of any procedure(s) performed by the resident/non-physician practitioner and I was immediately available to provide assistance  At this point I agree with the current assessment done in the Emergency Department  I have conducted an independent evaluation of this patient a history and physical is as follows:    Patient complains of persistent nausea and vomiting for the past few days  He has mild, epigastric abdominal discomfort that is worse with vomiting  He has had associated diarrhea for the past couple of days  He has had difficulty rehydrating  No significant history of similar symptoms  No hematochezia, melena or hematemesis  No change in symptoms w/voiding  No recent travel or similar sick contacts  Denies f/c, CP, SOB, hematuria or dysuria  12 system ROS o/w negative  PE: NAD, appears comfortable, alert; PERRL, EOMI; MMM, no posterior oropharyngeal exudate, edema or erythema; HRR, no murmur; lungs CTA w/o w/r/r, POx 95% on RA (nl); abdomen s/nd, minimal epigastric TTP without r/g/r, (-) Rovsing's, nl BS in all 4 quadrant; (-) LE edema or calf TTP, FROM extremities x4; skin p/w/d; CNs GI/NF, oriented  MDM: Nausea/vomiting/diarrhea - GI virus, gastroenteritis, acute pancreatitis, less likely but at risk for acute biliary disease, bowel obstruction, dehydration/ANTONIO, abnormal electrolytes  I independently reviewed and interpreted ordered labs from this encounter  A/P: Will check abdominal labs, treat symptoms, reevaluate for further work up and disposition      ED Course         Critical Care Time  Procedures

## 2023-03-21 NOTE — ED PROVIDER NOTES
History  Chief Complaint   Patient presents with   • Vomiting     Pt reports vomiting and diarrhea for several days  Patient is a 64-year-old male with a significant past medical history of hyperlipidemia, stroke, presenting for evaluation of epigastric abdominal discomfort and vomiting  He states that over the last 1 to 2 days he has had some mild discomfort in his epigastric region with approximately 4 episodes of nonbloody nonbilious vomiting  He states that he has had some subjective tactile fevers without any chills  He has had myalgias  He denies any changes in bowel movements, bloody or melanotic stools  He is denying any changes in his urination, or dysuria  He denies any history of abdominal surgeries  He has not tried anything for his symptoms  Prior to Admission Medications   Prescriptions Last Dose Informant Patient Reported? Taking?    Coenzyme Q10 100 MG TABS   Yes No   Sig: Take 1 tablet by mouth daily   Multiple Vitamin (MULTIVITAMINS PO)   Yes No   Sig: Take by mouth   Patient not taking: Reported on 10/3/2022   aspirin (ECOTRIN LOW STRENGTH) 81 mg EC tablet   Yes No   Sig: Take 81 mg by mouth daily   carbidopa (LODOSYN) 25 MG tablet   Yes No   Sig: Take 25 mg by mouth 3 (three) times a day as needed   carbidopa-levodopa (SINEMET)  mg per tablet   Yes No   Sig: Take 1 tablet by mouth 3 (three) times a day   lidocaine (LIDODERM) 5 %   No No   Sig: Apply 1 patch topically daily Remove & Discard patch within 12 hours or as directed by MD   Patient not taking: No sig reported   meclizine (ANTIVERT) 25 mg tablet   No No   Sig: Take 1 tablet (25 mg total) by mouth every 8 (eight) hours as needed for dizziness   meloxicam (MOBIC) 15 mg tablet   No No   Sig: Take 1 tablet (15 mg total) by mouth daily   Patient not taking: No sig reported   rosuvastatin (CRESTOR) 5 mg tablet   No No   Sig: Take 1 tablet (5 mg total) by mouth daily   tiZANidine (ZANAFLEX) 4 mg tablet   No No   Sig: Take 1 tablet (4 mg total) by mouth daily at bedtime   Patient not taking: No sig reported      Facility-Administered Medications: None       Past Medical History:   Diagnosis Date   • Hyperlipidemia    • Migraine    • Stroke ([facility] [address] )     possibleTIA       Past Surgical History:   Procedure Laterality Date   • FACIAL FRACTURE SURGERY         Family History   Problem Relation Age of Onset   • Diabetes Mother      I have reviewed and agree with the history as documented  E-Cigarette/Vaping   • E-Cigarette Use Never User      E-Cigarette/Vaping Substances   • Nicotine No    • THC No    • CBD No    • Flavoring No    • Other No    • Unknown No      Social History     Tobacco Use   • Smoking status: Former   • Smokeless tobacco: Never   Vaping Use   • Vaping Use: Never used   Substance Use Topics   • Alcohol use: Not Currently   • Drug use: No        Review of Systems   Constitutional: Positive for fever  Negative for chills  Respiratory: Negative for cough and shortness of breath  Cardiovascular: Negative for chest pain  Gastrointestinal: Positive for abdominal pain, nausea and vomiting  Negative for blood in stool, constipation and diarrhea  Genitourinary: Negative for dysuria  Neurological: Negative for light-headedness  All other systems reviewed and are negative  Physical Exam  ED Triage Vitals [03/21/23 0924]   Temperature Pulse Respirations Blood Pressure SpO2   98 1 °F (36 7 °C) 83 18 (!) 131/101 98 %      Temp Source Heart Rate Source Patient Position - Orthostatic VS BP Location FiO2 (%)   Tympanic Monitor Lying Left arm --      Pain Score       No Pain             Orthostatic Vital Signs  Vitals:    03/21/23 0924 03/21/23 1000 03/21/23 1100   BP: (!) 131/101 123/58 129/88   Pulse: 83 64 86   Patient Position - Orthostatic VS: Lying         Physical Exam  Vitals and nursing note reviewed  Constitutional:       General: He is not in acute distress  Appearance: Normal appearance   He is not ill-appearing or toxic-appearing  HENT:      Head: Normocephalic and atraumatic  Right Ear: External ear normal       Left Ear: External ear normal       Nose: Nose normal    Eyes:      General: No scleral icterus  Right eye: No discharge  Left eye: No discharge  Extraocular Movements: Extraocular movements intact  Conjunctiva/sclera: Conjunctivae normal    Cardiovascular:      Rate and Rhythm: Normal rate and regular rhythm  Heart sounds: Normal heart sounds  No murmur heard  No friction rub  No gallop  Pulmonary:      Effort: Pulmonary effort is normal  No respiratory distress  Breath sounds: Normal breath sounds  Abdominal:      General: Abdomen is flat  There is no distension  Palpations: Abdomen is soft  Tenderness: There is abdominal tenderness in the epigastric area  There is no guarding  Genitourinary:     Comments: Deferred  Musculoskeletal:         General: Normal range of motion  Cervical back: Normal range of motion  Skin:     General: Skin is warm and dry  Neurological:      Mental Status: He is alert     Psychiatric:         Mood and Affect: Mood normal          ED Medications  Medications   ketorolac (TORADOL) injection 15 mg (15 mg Intravenous Given 3/21/23 1027)   ondansetron (ZOFRAN) injection 4 mg (4 mg Intravenous Given 3/21/23 1027)   famotidine (PEPCID) tablet 20 mg (20 mg Oral Given 3/21/23 1034)   sucralfate (CARAFATE) tablet 1 g (1 g Oral Given 3/21/23 1027)   sodium chloride 0 9 % bolus 1,000 mL (0 mL Intravenous Stopped 3/21/23 1202)       Diagnostic Studies  Results Reviewed     Procedure Component Value Units Date/Time    CMP [[id number]]  (Abnormal) Collected: 03/21/23 1015    Lab Status: Final result Specimen: Blood from Arm, Right Updated: 03/21/23 1050     Sodium 140 mmol/L      Potassium 3 8 mmol/L      Chloride 111 mmol/L      CO2 27 mmol/L      ANION GAP 2 mmol/L      BUN 12 mg/dL      Creatinine 0 76 mg/dL Glucose 117 mg/dL      Calcium 9 0 mg/dL      AST 23 U/L      ALT 27 U/L      Alkaline Phosphatase 49 U/L      Total Protein 7 0 g/dL      Albumin 3 9 g/dL      Total Bilirubin 0 81 mg/dL      eGFR 105 ml/min/1 73sq m     Narrative:      National Kid[employer] guidelines for Chronic Kidney Disease (CKD):   •  Stage 1 with normal or high GFR (GFR > 90 mL/min/1 73 square meters)  •  Stage 2 Mild CKD (GFR = 60-89 mL/min/1 73 square meters)  •  Stage 3A Moderate CKD (GFR = 45-59 mL/min/1 73 square meters)  •  Stage 3B Moderate CKD (GFR = 30-44 mL/min/1 73 square meters)  •  Stage 4 Severe CKD (GFR = 15-29 mL/min/1 73 square meters)  •  Stage 5 End Stage CKD (GFR <15 mL/min/1 73 square meters)  Note: GFR calculation is accurate only with a steady state creatinine    Lipase [[id number]]  (Abnormal) Collected: 03/21/23 1015    Lab Status: Final result Specimen: Blood from Arm, Right Updated: 03/21/23 1050     Lipase 72 u/L     CBC and differential [[id number]] Collected: 03/21/23 1015    Lab Status: Final result Specimen: Blood from Arm, Right Updated: 03/21/23 1026     WBC 7 73 Thousand/uL      RBC 4 68 Million/uL      Hemoglobin 14 8 g/dL      Hematocrit 43 3 %      MCV 93 fL      MCH 31 6 pg      MCHC 34 2 g/dL      RDW 12 2 %      MPV 9 8 fL      Platelets 114 Thousands/uL      nRBC 0 /100 WBCs      Neutrophils Relative 70 %      Immat GRANS % 0 %      Lymphocytes Relative 18 %      Monocytes Relative 10 %      Eosinophils Relative 1 %      Basophils Relative 1 %      Neutrophils Absolute 5 46 Thousands/µL      Immature Grans Absolute 0 03 Thousand/uL      Lymphocytes Absolute 1 37 Thousands/µL      Monocytes Absolute 0 75 Thousand/µL      Eosinophils Absolute 0 08 Thousand/µL      Basophils Absolute 0 04 Thousands/µL                  No orders to display         Procedures  Procedures      ED Course  ED Course as of 03/22/23 0504   Tue Mar 21, 2023   1059 Procedure Note: EKG  Date/Time: 03/21/23 10:59 AM Interpreted by: Noa Sprague   Indications / Diagnosis: epigastric abdominal pain  ECG reviewed by me, the ED Provider: yes   The EKG demonstrates:  Rhythm: normal sinus  Intervals: normal intervals  Axis: normal axis  QRS/Blocks: normal QRS, incomplete RBBB  ST Changes: No acute ST Changes, no STD/BROOK  Medical Decision Making  Patient is a 47year old male presenting with epigastric abdominal discomfort and vomiting  Differential diagnosis includes gastritis, GERD  Abdominal exam without peritoneal signs  No evidence of acute abdomen at this time  Well appearing  Low suspicion for acute hepatobiliary disease (includng acute cholecystitis), acute pancreatitis, perforated ulcer, acute infectious processes (pneumonia, hepatitis, pyelonephritis), atypical appendicitis, vascular catastrophe, or bowel obstruction  Presentation not consistent with other acute, emergent causes of abdominal pain at this time  Plan: CBC, CMP, lipase, GI cocktail, serial reassessment    On reassessment, patient with significant improvement in their symptoms following GI cocktail  Labs largely unremarkable  ECG ordered by [facility] nursing protocol as independently interpreted by me as above in the ED course  I discussed with the patient that I felt like his symptoms were likely secondary to gastritis, and that given his improvements felt safe discharging him home  He agreed that he is feeling significantly better and feels comfortable managing his symptoms at home  Stable for discharge home with primary care follow-up  Pepcid, Zofran, and Carafate sent to patient pharmacy  Patient seems to understand this plan and is agreeable  All questions answered  Patient discharged home with return precautions  I independently reviewed the patient's chart including his most recent office visit  I considered the patient's chronic medical conditions and my medical decision making    I used the  for the duration of this interaction as the patient is primarily [place]-speaking  Amount and/or Complexity of Data Reviewed  Labs: ordered  Risk  Prescription drug management  Disposition  Final diagnoses:   Epigastric abdominal pain   Nausea and vomiting     Time reflects when diagnosis was documented in both MDM as applicable and the Disposition within this note     Time User Action Codes Description Comment    3/21/2023 11:08 AM Cherelle Lowers Add [R10 13] Epigastric abdominal pain     3/21/2023 11:09 AM Cherelle Lowers Add [R11 2] Nausea and vomiting       ED Disposition     ED Disposition   Discharge    Condition   Stable    Date/Time   Tue Mar 21, 2023 11:08 AM    Comment   Cj Organ discharge to home/self care                 Follow-up Information     Follow up With Specialties Details Why Contact Info    [employer]  Call  To find a primary care doctor [phone]            Discharge Medication List as of 3/21/2023 11:50 AM      START taking these medications    Details   famotidine (PEPCID) 20 mg tablet Take 1 tablet (20 mg total) by mouth 2 (two) times a day for 7 days, Starting Tue 3/21/2023, Until Tue 3/28/2023, Normal      ondansetron (ZOFRAN-ODT) 4 mg disintegrating tablet Take 1 tablet (4 mg total) by mouth every 6 (six) hours as needed for nausea or vomiting for up to 6 doses, Starting Tue 3/21/2023, Normal      sucralfate (CARAFATE) 1 g tablet Take 1 tablet (1 g total) by mouth 4 (four) times a day for 7 days, Starting Tue 3/21/2023, Until Tue 3/28/2023, Normal         CONTINUE these medications which have NOT CHANGED    Details   rosuvastatin (CRESTOR) 5 mg tablet Take 1 tablet (5 mg total) by mouth daily, Starting Mon 12/12/2022, Normal      aspirin (ECOTRIN LOW STRENGTH) 81 mg EC tablet Take 81 mg by mouth daily, Historical Med      carbidopa (LODOSYN) 25 MG tablet Take 25 mg by mouth 3 (three) times a day as needed, Starting Fri 11/19/2021, Until Sat 11/19/2022 at 3827, Historical Med      carbidopa-levodopa (SINEMET)  mg per tablet Take 1 tablet by mouth 3 (three) times a day, Starting Fri 8/20/2021, Until Mon 10/3/2022, Historical Med      Coenzyme Q10 100 MG TABS Take 1 tablet by mouth daily, Historical Med      lidocaine (LIDODERM) 5 % Apply 1 patch topically daily Remove & Discard patch within 12 hours or as directed by MD, Starting Thu 10/1/2020, Print      meclizine (ANTIVERT) 25 mg tablet Take 1 tablet (25 mg total) by mouth every 8 (eight) hours as needed for dizziness, Starting Sat 8/31/2019, Print      meloxicam (MOBIC) 15 mg tablet Take 1 tablet (15 mg total) by mouth daily, Starting Wed 1/29/2020, Normal      Multiple Vitamin (MULTIVITAMINS PO) Take by mouth, Historical Med      tiZANidine (ZANAFLEX) 4 mg tablet Take 1 tablet (4 mg total) by mouth daily at bedtime, Starting Wed 1/29/2020, Normal           No discharge procedures on file  PDMP Review     None           ED Provider  Attending physically available and evaluated Sowmya Vaishali NAYAK managed the patient along with the ED Attending      Electronically Signed by         Charo Decker DO  03/22/23 7079